# Patient Record
Sex: FEMALE | Race: OTHER | HISPANIC OR LATINO | URBAN - METROPOLITAN AREA
[De-identification: names, ages, dates, MRNs, and addresses within clinical notes are randomized per-mention and may not be internally consistent; named-entity substitution may affect disease eponyms.]

---

## 2017-05-29 ENCOUNTER — INPATIENT (INPATIENT)
Facility: HOSPITAL | Age: 47
LOS: 3 days | Discharge: HOME | DRG: 299 | End: 2017-06-02
Attending: FAMILY MEDICINE | Admitting: FAMILY MEDICINE
Payer: COMMERCIAL

## 2017-05-29 VITALS
WEIGHT: 167.55 LBS | RESPIRATION RATE: 20 BRPM | OXYGEN SATURATION: 100 % | HEART RATE: 68 BPM | TEMPERATURE: 98 F | DIASTOLIC BLOOD PRESSURE: 63 MMHG | SYSTOLIC BLOOD PRESSURE: 99 MMHG

## 2017-05-29 DIAGNOSIS — I82.409 ACUTE EMBOLISM AND THROMBOSIS OF UNSPECIFIED DEEP VEINS OF UNSPECIFIED LOWER EXTREMITY: ICD-10-CM

## 2017-05-29 LAB
ALBUMIN SERPL ELPH-MCNC: 4.1 G/DL — SIGNIFICANT CHANGE UP (ref 3.3–5.2)
ALP SERPL-CCNC: 65 U/L — SIGNIFICANT CHANGE UP (ref 40–120)
ALT FLD-CCNC: 5 U/L — SIGNIFICANT CHANGE UP
ANION GAP SERPL CALC-SCNC: 12 MMOL/L — SIGNIFICANT CHANGE UP (ref 5–17)
APTT BLD: 26.6 SEC — LOW (ref 27.5–37.4)
AST SERPL-CCNC: 15 U/L — SIGNIFICANT CHANGE UP
BASOPHILS # BLD AUTO: 0 K/UL — SIGNIFICANT CHANGE UP (ref 0–0.2)
BASOPHILS NFR BLD AUTO: 0.2 % — SIGNIFICANT CHANGE UP (ref 0–2)
BILIRUB SERPL-MCNC: 0.2 MG/DL — LOW (ref 0.4–2)
BUN SERPL-MCNC: 20 MG/DL — SIGNIFICANT CHANGE UP (ref 8–20)
CALCIUM SERPL-MCNC: 9.4 MG/DL — SIGNIFICANT CHANGE UP (ref 8.6–10.2)
CHLORIDE SERPL-SCNC: 102 MMOL/L — SIGNIFICANT CHANGE UP (ref 98–107)
CO2 SERPL-SCNC: 25 MMOL/L — SIGNIFICANT CHANGE UP (ref 22–29)
CREAT SERPL-MCNC: 0.55 MG/DL — SIGNIFICANT CHANGE UP (ref 0.5–1.3)
EOSINOPHIL # BLD AUTO: 0.5 K/UL — SIGNIFICANT CHANGE UP (ref 0–0.5)
EOSINOPHIL NFR BLD AUTO: 6.1 % — HIGH (ref 0–6)
GLUCOSE SERPL-MCNC: 92 MG/DL — SIGNIFICANT CHANGE UP (ref 70–115)
HCT VFR BLD CALC: 35.4 % — LOW (ref 37–47)
HGB BLD-MCNC: 11.7 G/DL — LOW (ref 12–16)
INR BLD: 1.09 RATIO — SIGNIFICANT CHANGE UP (ref 0.88–1.16)
LYMPHOCYTES # BLD AUTO: 2.2 K/UL — SIGNIFICANT CHANGE UP (ref 1–4.8)
LYMPHOCYTES # BLD AUTO: 25.3 % — SIGNIFICANT CHANGE UP (ref 20–55)
MCHC RBC-ENTMCNC: 28.2 PG — SIGNIFICANT CHANGE UP (ref 27–31)
MCHC RBC-ENTMCNC: 33.1 G/DL — SIGNIFICANT CHANGE UP (ref 32–36)
MCV RBC AUTO: 85.3 FL — SIGNIFICANT CHANGE UP (ref 81–99)
MONOCYTES # BLD AUTO: 0.4 K/UL — SIGNIFICANT CHANGE UP (ref 0–0.8)
MONOCYTES NFR BLD AUTO: 4 % — SIGNIFICANT CHANGE UP (ref 3–10)
NEUTROPHILS # BLD AUTO: 5.6 K/UL — SIGNIFICANT CHANGE UP (ref 1.8–8)
NEUTROPHILS NFR BLD AUTO: 64.2 % — SIGNIFICANT CHANGE UP (ref 37–73)
PLATELET # BLD AUTO: 346 K/UL — SIGNIFICANT CHANGE UP (ref 150–400)
POTASSIUM SERPL-MCNC: 4.1 MMOL/L — SIGNIFICANT CHANGE UP (ref 3.5–5.3)
POTASSIUM SERPL-SCNC: 4.1 MMOL/L — SIGNIFICANT CHANGE UP (ref 3.5–5.3)
PROT SERPL-MCNC: 7.8 G/DL — SIGNIFICANT CHANGE UP (ref 6.6–8.7)
PROTHROM AB SERPL-ACNC: 12 SEC — SIGNIFICANT CHANGE UP (ref 9.8–12.7)
RBC # BLD: 4.15 M/UL — LOW (ref 4.4–5.2)
RBC # FLD: 12.8 % — SIGNIFICANT CHANGE UP (ref 11–15.6)
SODIUM SERPL-SCNC: 139 MMOL/L — SIGNIFICANT CHANGE UP (ref 135–145)
WBC # BLD: 8.7 K/UL — SIGNIFICANT CHANGE UP (ref 4.8–10.8)
WBC # FLD AUTO: 8.7 K/UL — SIGNIFICANT CHANGE UP (ref 4.8–10.8)

## 2017-05-29 PROCEDURE — 93971 EXTREMITY STUDY: CPT | Mod: 26,RT

## 2017-05-29 PROCEDURE — 99285 EMERGENCY DEPT VISIT HI MDM: CPT

## 2017-05-29 RX ORDER — ACETAMINOPHEN 500 MG
650 TABLET ORAL EVERY 6 HOURS
Qty: 0 | Refills: 0 | Status: DISCONTINUED | OUTPATIENT
Start: 2017-05-29 | End: 2017-06-02

## 2017-05-29 RX ORDER — OXYCODONE HYDROCHLORIDE 5 MG/1
5 TABLET ORAL EVERY 6 HOURS
Qty: 0 | Refills: 0 | Status: DISCONTINUED | OUTPATIENT
Start: 2017-05-29 | End: 2017-05-30

## 2017-05-29 RX ORDER — ENOXAPARIN SODIUM 100 MG/ML
80 INJECTION SUBCUTANEOUS ONCE
Qty: 0 | Refills: 0 | Status: DISCONTINUED | OUTPATIENT
Start: 2017-05-29 | End: 2017-05-29

## 2017-05-29 RX ORDER — ENOXAPARIN SODIUM 100 MG/ML
80 INJECTION SUBCUTANEOUS ONCE
Qty: 0 | Refills: 0 | Status: COMPLETED | OUTPATIENT
Start: 2017-05-29 | End: 2017-05-29

## 2017-05-29 RX ORDER — RIVAROXABAN 15 MG-20MG
15 KIT ORAL ONCE
Qty: 0 | Refills: 0 | Status: DISCONTINUED | OUTPATIENT
Start: 2017-05-29 | End: 2017-05-29

## 2017-05-29 RX ORDER — ENOXAPARIN SODIUM 100 MG/ML
80 INJECTION SUBCUTANEOUS EVERY 12 HOURS
Qty: 0 | Refills: 0 | Status: DISCONTINUED | OUTPATIENT
Start: 2017-05-29 | End: 2017-05-30

## 2017-05-29 RX ORDER — LEVOTHYROXINE SODIUM 125 MCG
1 TABLET ORAL
Qty: 0 | Refills: 0 | COMMUNITY

## 2017-05-29 RX ORDER — LEVOTHYROXINE SODIUM 125 MCG
50 TABLET ORAL DAILY
Qty: 0 | Refills: 0 | Status: DISCONTINUED | OUTPATIENT
Start: 2017-05-29 | End: 2017-06-02

## 2017-05-29 RX ADMIN — ENOXAPARIN SODIUM 80 MILLIGRAM(S): 100 INJECTION SUBCUTANEOUS at 20:21

## 2017-05-29 NOTE — H&P ADULT - NSHPPHYSICALEXAM_GEN_ALL_CORE
HEENT- PEARLA  Neck Supple  Cardio - S1 S2 No murmer  Lungs- CTA  Abd- Soft NT ND, BS  Rectal Pelvic Breast- Refused  EXt - + RLE Calf tenderness, Knee incision clean  Skin- No SSL  Neuro - [place]

## 2017-05-29 NOTE — ED STATDOCS - OBJECTIVE STATEMENT
45 y/o F pt with PMHx of hypothyroidism and PSHx of c-sections presents to ED c/o increased pain and swelling to right calf x2 days. Pt reports she had surgery to the right meniscus 2 weeks ago and was sent to ED today to eval for DVTs. She takes Synthroid daily and Naproxen and oxycodone (last dose 11 am) prn. She has a FHx of "clotting too much" on her father's side. She notes she had a severe headache this am that subsided. Pt denies heavy lifting, fever, chills, CP, SOB, cough, phlegm, nausea, vomiting, decreased PO intake, abdominal pain, and back pain. No further complaints at this time. Allergy to penicillin, doxycycline, and amoxicillin.

## 2017-05-29 NOTE — ED ADULT NURSE NOTE - CHIEF COMPLAINT QUOTE
rt knee surgery 3 weeks ago in [facility]. swelling to the calf on the same side. tightness. no chest pain, no dyspnea. swelling

## 2017-05-29 NOTE — ED STATDOCS - PMH
Cervical dysplasia  ' 2009  HPV in female  '   Hypothyroidism  dx: ' .  medically managed  Incompetence of cervix    Iron deficiency anemia  Medically managed  Missed   '  and 2010 ( 1st trimester)  Normal IUP (intrauterine pregnancy) on prenatal ultrasound  12 weeks. E.D.C. 13

## 2017-05-29 NOTE — ED STATDOCS - MUSCULOSKELETAL, MLM
Neck is supple with FROM. Spine is midline. No CVAT. Trace edema to RLE. Joint is intact. No laxity. No palpable cord.

## 2017-05-29 NOTE — ED STATDOCS - ATTENDING CONTRIBUTION TO CARE
I, Chip Cm, performed the initial face to face bedside interview with this patient regarding history of present illness, review of symptoms and relevant past medical, social and family history.  I completed an independent physical examination.  I was the initial provider who evaluated this patient. I have signed out the follow up of any pending tests (i.e. labs, radiological studies) to the ACP.  I have communicated the patient’s plan of care and disposition with the ACP.  The history, relevant review of systems, past medical and surgical history, medical decision making, and physical examination was documented by the scribe in my presence and I attest to the accuracy of the documentation.

## 2017-05-29 NOTE — ED ADULT NURSE NOTE - PSH
History of   '   Hx of dilation and curettage  For Missed  ( 1st Trimester):  2009 and 2010  S/P cone biopsy of cervix  ' . ( twice)

## 2017-05-29 NOTE — DISCHARGE NOTE ADULT - MEDICATION SUMMARY - MEDICATIONS TO TAKE
I will START or STAY ON the medications listed below when I get home from the hospital:    acetaminophen 325 mg oral tablet  -- 2 tab(s) by mouth every 6 hours, As needed, pain  -- Indication: For Pain    Percocet 5/325 oral tablet  -- 1 tab(s) by mouth every 6 hours prn MDD:4  -- Indication: For Pian    apixaban 5 mg oral tablet  -- 1 tab(s) by mouth 2 times a day  -- Indication: For PE    levothyroxine 50 mcg (0.05 mg) oral tablet  -- 1 tab(s) by mouth once a day  -- Indication: For Thyriod

## 2017-05-29 NOTE — DISCHARGE NOTE ADULT - CARE PLAN
Principal Discharge DX:	Pulmonary embolism  Goal:	Anticoaguation  Instructions for follow-up, activity and diet:	Regular

## 2017-05-29 NOTE — ED ADULT TRIAGE NOTE - CHIEF COMPLAINT QUOTE
rt knee surgery 3 weeks ago in [facility]. swelling to the calf on the same side. tightness. no chest pain, no dyspnea. swelling

## 2017-05-29 NOTE — ED STATDOCS - PROGRESS NOTE DETAILS
Pt seen and evaluated. 47 yo F presented to ED with RLE swelling x 2-3 days. Pt is s/p knee surgery and [place] [facility] 2 weeks ago. Pt denies any CP, palp or SOB. Pt well appearing with +STS RLW and calf ttp. +DVT - Father currently in hospital also with DVT and Factor 5 def. Case d/w Dr Woodson who recommends admission.

## 2017-05-29 NOTE — ED STATDOCS - MEDICAL DECISION MAKING DETAILS
Pain and swelling to right leg s/p surgery x2 weeks ago. Sent for Dopplers to eval for DVT. Denies CP/SOB. Check results and reassess.

## 2017-05-29 NOTE — DISCHARGE NOTE ADULT - HOSPITAL COURSE
45 yo admitted with DVT and PE rt heart no strain Hypercoag workup pending, pt started on Eloquist will Follow with hematologist. + Family history of hypercoagulable state.

## 2017-05-29 NOTE — ED STATDOCS - NS ED MD SCRIBE ATTENDING SCRIBE SECTIONS
REVIEW OF SYSTEMS/VITAL SIGNS( Pullset)/HISTORY OF PRESENT ILLNESS/PHYSICAL EXAM/DISPOSITION/HIV/PAST MEDICAL/SURGICAL/SOCIAL HISTORY

## 2017-05-30 DIAGNOSIS — I26.99 OTHER PULMONARY EMBOLISM WITHOUT ACUTE COR PULMONALE: ICD-10-CM

## 2017-05-30 DIAGNOSIS — D68.59 OTHER PRIMARY THROMBOPHILIA: ICD-10-CM

## 2017-05-30 DIAGNOSIS — I82.411 ACUTE EMBOLISM AND THROMBOSIS OF RIGHT FEMORAL VEIN: ICD-10-CM

## 2017-05-30 DIAGNOSIS — I82.409 ACUTE EMBOLISM AND THROMBOSIS OF UNSPECIFIED DEEP VEINS OF UNSPECIFIED LOWER EXTREMITY: ICD-10-CM

## 2017-05-30 LAB
APTT BLD: 169.8 SEC — CRITICAL HIGH (ref 27.5–37.4)
APTT BLD: >200 SEC — CRITICAL HIGH (ref 27.5–37.4)
HCYS SERPL-MCNC: 4.8 UMOL/L — LOW (ref 5–15)
INR BLD: 1.16 RATIO — SIGNIFICANT CHANGE UP (ref 0.88–1.16)
NT-PROBNP SERPL-SCNC: 110 PG/ML — SIGNIFICANT CHANGE UP (ref 0–300)
PROTHROM AB SERPL-ACNC: 12.8 SEC — HIGH (ref 9.8–12.7)
TROPONIN T SERPL-MCNC: <0.01 NG/ML — SIGNIFICANT CHANGE UP (ref 0–0.06)

## 2017-05-30 PROCEDURE — 99223 1ST HOSP IP/OBS HIGH 75: CPT

## 2017-05-30 PROCEDURE — 99253 IP/OBS CNSLTJ NEW/EST LOW 45: CPT

## 2017-05-30 PROCEDURE — 71275 CT ANGIOGRAPHY CHEST: CPT | Mod: 26

## 2017-05-30 PROCEDURE — 93306 TTE W/DOPPLER COMPLETE: CPT | Mod: 26

## 2017-05-30 RX ORDER — HEPARIN SODIUM 5000 [USP'U]/ML
3000 INJECTION INTRAVENOUS; SUBCUTANEOUS EVERY 6 HOURS
Qty: 0 | Refills: 0 | Status: DISCONTINUED | OUTPATIENT
Start: 2017-05-30 | End: 2017-06-01

## 2017-05-30 RX ORDER — KETOROLAC TROMETHAMINE 30 MG/ML
30 SYRINGE (ML) INJECTION EVERY 6 HOURS
Qty: 0 | Refills: 0 | Status: DISCONTINUED | OUTPATIENT
Start: 2017-05-30 | End: 2017-06-02

## 2017-05-30 RX ORDER — HEPARIN SODIUM 5000 [USP'U]/ML
INJECTION INTRAVENOUS; SUBCUTANEOUS
Qty: 25000 | Refills: 0 | Status: DISCONTINUED | OUTPATIENT
Start: 2017-05-30 | End: 2017-06-01

## 2017-05-30 RX ORDER — HEPARIN SODIUM 5000 [USP'U]/ML
6500 INJECTION INTRAVENOUS; SUBCUTANEOUS ONCE
Qty: 0 | Refills: 0 | Status: COMPLETED | OUTPATIENT
Start: 2017-05-30 | End: 2017-05-30

## 2017-05-30 RX ORDER — MORPHINE SULFATE 50 MG/1
2 CAPSULE, EXTENDED RELEASE ORAL EVERY 4 HOURS
Qty: 0 | Refills: 0 | Status: DISCONTINUED | OUTPATIENT
Start: 2017-05-30 | End: 2017-06-02

## 2017-05-30 RX ORDER — MORPHINE SULFATE 50 MG/1
2 CAPSULE, EXTENDED RELEASE ORAL EVERY 6 HOURS
Qty: 0 | Refills: 0 | Status: DISCONTINUED | OUTPATIENT
Start: 2017-05-30 | End: 2017-05-30

## 2017-05-30 RX ORDER — MORPHINE SULFATE 50 MG/1
2 CAPSULE, EXTENDED RELEASE ORAL EVERY 6 HOURS
Qty: 0 | Refills: 0 | Status: DISCONTINUED | OUTPATIENT
Start: 2017-05-30 | End: 2017-06-02

## 2017-05-30 RX ORDER — HEPARIN SODIUM 5000 [USP'U]/ML
6500 INJECTION INTRAVENOUS; SUBCUTANEOUS EVERY 6 HOURS
Qty: 0 | Refills: 0 | Status: DISCONTINUED | OUTPATIENT
Start: 2017-05-30 | End: 2017-06-01

## 2017-05-30 RX ORDER — SODIUM CHLORIDE 9 MG/ML
1000 INJECTION INTRAMUSCULAR; INTRAVENOUS; SUBCUTANEOUS
Qty: 0 | Refills: 0 | Status: DISCONTINUED | OUTPATIENT
Start: 2017-05-30 | End: 2017-05-31

## 2017-05-30 RX ADMIN — MORPHINE SULFATE 2 MILLIGRAM(S): 50 CAPSULE, EXTENDED RELEASE ORAL at 20:34

## 2017-05-30 RX ADMIN — OXYCODONE HYDROCHLORIDE 5 MILLIGRAM(S): 5 TABLET ORAL at 08:38

## 2017-05-30 RX ADMIN — Medication 30 MILLIGRAM(S): at 20:22

## 2017-05-30 RX ADMIN — MORPHINE SULFATE 2 MILLIGRAM(S): 50 CAPSULE, EXTENDED RELEASE ORAL at 22:30

## 2017-05-30 RX ADMIN — MORPHINE SULFATE 2 MILLIGRAM(S): 50 CAPSULE, EXTENDED RELEASE ORAL at 21:00

## 2017-05-30 RX ADMIN — MORPHINE SULFATE 2 MILLIGRAM(S): 50 CAPSULE, EXTENDED RELEASE ORAL at 14:45

## 2017-05-30 RX ADMIN — MORPHINE SULFATE 2 MILLIGRAM(S): 50 CAPSULE, EXTENDED RELEASE ORAL at 15:05

## 2017-05-30 RX ADMIN — MORPHINE SULFATE 2 MILLIGRAM(S): 50 CAPSULE, EXTENDED RELEASE ORAL at 22:28

## 2017-05-30 RX ADMIN — ENOXAPARIN SODIUM 80 MILLIGRAM(S): 100 INJECTION SUBCUTANEOUS at 08:41

## 2017-05-30 RX ADMIN — MORPHINE SULFATE 2 MILLIGRAM(S): 50 CAPSULE, EXTENDED RELEASE ORAL at 18:34

## 2017-05-30 RX ADMIN — SODIUM CHLORIDE 100 MILLILITER(S): 9 INJECTION INTRAMUSCULAR; INTRAVENOUS; SUBCUTANEOUS at 10:05

## 2017-05-30 RX ADMIN — Medication 30 MILLIGRAM(S): at 19:31

## 2017-05-30 RX ADMIN — Medication 50 MICROGRAM(S): at 06:09

## 2017-05-30 RX ADMIN — Medication 30 MILLIGRAM(S): at 19:00

## 2017-05-30 RX ADMIN — MORPHINE SULFATE 2 MILLIGRAM(S): 50 CAPSULE, EXTENDED RELEASE ORAL at 17:43

## 2017-05-30 RX ADMIN — OXYCODONE HYDROCHLORIDE 5 MILLIGRAM(S): 5 TABLET ORAL at 09:15

## 2017-05-30 RX ADMIN — HEPARIN SODIUM 1400 UNIT(S)/HR: 5000 INJECTION INTRAVENOUS; SUBCUTANEOUS at 17:25

## 2017-05-30 RX ADMIN — HEPARIN SODIUM 6500 UNIT(S): 5000 INJECTION INTRAVENOUS; SUBCUTANEOUS at 17:24

## 2017-05-30 NOTE — CONSULT NOTE ADULT - PROBLEM SELECTOR RECOMMENDATION 2
No indication for Filter.
Patient currently not a surgical candidate at this time.  Continue anticoagulation as per primary team.  TTE pending, recommending cardiology consult pending TTE results for possible EKOS.  Recommending moving patient to monitored unit.

## 2017-05-30 NOTE — CONSULT NOTE ADULT - PROBLEM SELECTOR RECOMMENDATION 9
Patient with large PE burden. No RV strain by TTE. Not requiring O2, blood pressure and heart rate normal. Will defer any invasive PE treatment at this time.  Heparin. Eliquis, bridge for 2 hours.
Recommending SCD boots while in bed.  Continue anticoagulation as per primary team.

## 2017-05-30 NOTE — CONSULT NOTE ADULT - ATTENDING COMMENTS
Agree with above.  on echo--no strain, completely stable--no need for surgical embolectomy.  Continue with conservative care, hematology consult, AC

## 2017-05-30 NOTE — PROGRESS NOTE ADULT - SUBJECTIVE AND OBJECTIVE BOX
HPI:  45 yo with recent Knee surgery developed rt leg and calf pain (29 May 2017 18:38)     Allergies    amoxicillin (Stomach Upset; Headache)  doxycycline (Stomach Upset; Headache)    Intolerances      HPV in female  Normal IUP (intrauterine pregnancy) on prenatal ultrasound  Missed   Iron deficiency anemia  Cervical dysplasia  Hypothyroidism  Incompetence of cervix    MEDICATIONS  (STANDING):  enoxaparin Injectable 80milliGRAM(s) SubCutaneous every 12 hours  levothyroxine 50MICROGram(s) Oral daily  sodium chloride 0.9%. 1000milliLiter(s) IV Continuous <Continuous>    MEDICATIONS  (PRN):  acetaminophen   Tablet 650milliGRAM(s) Oral every 6 hours PRN pain  oxyCODONE IR 5milliGRAM(s) Oral every 6 hours PRN Moderate Pain (4 - 6)  morphine  - Injectable 2milliGRAM(s) IV Push every 6 hours PRN pain                           11.7   8.7   )-----------( 346      ( 29 May 2017 16:20 )             35.4         139  |  102  |  20.0  ----------------------------<  92  4.1   |  25.0  |  0.55    Ca    9.4      29 May 2017 16:20    TPro  7.8  /  Alb  4.1  /  TBili  0.2<L>  /  DBili  x   /  AST  15  /  ALT  5   /  AlkPhos  65      PT/INR - ( 29 May 2017 16:20 )   PT: 12.0 sec;   INR: 1.09 ratio         PTT - ( 29 May 2017 16:20 )  PTT:26.6 sec  ;  Vital Signs Last 24 Hrs  T(C): 36.7, Max: 36.8 ( @ 21:45)  T(F): 98.1, Max: 98.3 ( @ 21:45)  HR: 81 (72 - 82)  BP: 116/77 (92/68 - 116/77)  BP(mean): --  RR: 18 (16 - 18)  SpO2: 100% (96% - 100%)        I & Os for current day (as of  @ 15:17)  =============================================  IN: 300 ml / OUT: 0 ml / NET: 300 ml    Pt denies SOB, Mild Chest pain     HEENT- PEARLA Neck Supple Cardio - S1 S2 No murmur Lungs- CTA Abd- Soft NT ND, BS Rectal Pelvic Breast- Refused EXt - + RLE Calf tenderness, Knee incision clean Skin- No SSL Neuro - Aw[place]	        DVT / PE - Hercoag Workup and Lovenox, more symptomatic Cardiothorasic aware denied ICU admission Echo- pending, will transfer to a monitored floor HPI:  45 yo with recent Knee surgery developed rt leg and calf pain (29 May 2017 18:38)     Allergies    amoxicillin (Stomach Upset; Headache)  doxycycline (Stomach Upset; Headache)    Intolerances      HPV in female  Normal IUP (intrauterine pregnancy) on prenatal ultrasound  Missed   Iron deficiency anemia  Cervical dysplasia  Hypothyroidism  Incompetence of cervix    MEDICATIONS  (STANDING):  enoxaparin Injectable 80milliGRAM(s) SubCutaneous every 12 hours  levothyroxine 50MICROGram(s) Oral daily  sodium chloride 0.9%. 1000milliLiter(s) IV Continuous <Continuous>    MEDICATIONS  (PRN):  acetaminophen   Tablet 650milliGRAM(s) Oral every 6 hours PRN pain  oxyCODONE IR 5milliGRAM(s) Oral every 6 hours PRN Moderate Pain (4 - 6)  morphine  - Injectable 2milliGRAM(s) IV Push every 6 hours PRN pain                           11.7   8.7   )-----------( 346      ( 29 May 2017 16:20 )             35.4         139  |  102  |  20.0  ----------------------------<  92  4.1   |  25.0  |  0.55    Ca    9.4      29 May 2017 16:20    TPro  7.8  /  Alb  4.1  /  TBili  0.2<L>  /  DBili  x   /  AST  15  /  ALT  5   /  AlkPhos  65      PT/INR - ( 29 May 2017 16:20 )   PT: 12.0 sec;   INR: 1.09 ratio         PTT - ( 29 May 2017 16:20 )  PTT:26.6 sec  ;  Vital Signs Last 24 Hrs  T(C): 36.7, Max: 36.8 ( @ 21:45)  T(F): 98.1, Max: 98.3 ( @ 21:45)  HR: 81 (72 - 82)  BP: 116/77 (92/68 - 116/77)  BP(mean): --  RR: 18 (16 - 18)  SpO2: 100% (96% - 100%)        I & Os for current day (as of  @ 15:17)  =============================================  IN: 300 ml / OUT: 0 ml / NET: 300 ml    Pt denies SOB, Mild Chest pain     HEENT- PEARLA Neck Supple Cardio - S1 S2 No murmur Lungs- CTA Abd- Soft NT ND, BS Rectal Pelvic Breast- Refused EXt - + RLE Calf tenderness, Knee incision clean Skin- No SSL Neuro - Awake Pleasant	        DVT / PE - Hercoag Workup and Lovenox, more symptomatic Cardiothorasic aware will transfer ICU Echo- pending, will transfer to a monitored floor

## 2017-05-30 NOTE — CONSULT NOTE ADULT - SUBJECTIVE AND OBJECTIVE BOX
CC: Shortness of breath.     HPI: Patient is a  46y Female with history of recent arthroscopic surgery presenting with symptoms of leg pain and shortness of breath. Patient admitted to symptoms of chest pain and shortness of breath. Noted to have DVT and PE. PE burden noted to be significant. CT with ? of RV strain.   Patient with family history of ? hypercoagulable state.   Now with pleuritic chest pain.     PAST MEDICAL & SURGICAL HISTORY:  HPV in female: &#x27; 2009  Normal IUP (intrauterine pregnancy) on prenatal ultrasound: 12 weeks. E.D.C. 13  Missed : &#x27; 2009 and &#x27;  ( 1st trimester)  Iron deficiency anemia: Medically managed  Cervical dysplasia: &#x27;   Hypothyroidism: dx: &#x27; .  medically managed  Incompetence of cervix  Hx of dilation and curettage: For Missed  ( 1st Trimester):  &#x27;  and &#x27;   S/P cone biopsy of cervix: &#x27; . ( twice)  History of : &#x27;     FAMILY HISTORY:  Family history of DVT (Father)    SOCIAL HISTORY: no EtOH, drugs or tobacco    MEDICATIONS  (STANDING):  levothyroxine 50MICROGram(s) Oral daily  sodium chloride 0.9%. 1000milliLiter(s) IV Continuous <Continuous>  heparin  Infusion. Unit(s)/Hr IV Continuous <Continuous>  morphine  - Injectable 2milliGRAM(s) IV Push every 4 hours    ROS: All others negative    PHYSICAL EXAM:  Vital Signs Last 24 Hrs  T(C): 36.8, Max: 36.8 (- @ 21:45)  T(F): 98.3, Max: 98.3 (05-29 @ 21:45)  HR: 77 (72 - 82)  BP: 104/70 (92/68 - 116/77)  BP(mean): 83 (83 - 83)  RR: 19 (16 - 19)  SpO2: 100% (96% - 100%)  I&O's Summary    I & Os for current day (as of 30 May 2017 18:27)  =============================================  IN: 628 ml / OUT: 0 ml / NET: 628 ml    Appearance: Normal	  HEENT:   Normal oral mucosa, PERRL, EOMI	  Lymphatic: No lymphadenopathy  Cardiovascular: Normal S1 S2, No JVD, No murmurs, No edema  Respiratory: Lungs clear to auscultation	  Psychiatry: A & O x 3, Mood & affect appropriate  Gastrointestinal:  Soft, Non-tender, + BS	  Skin: No rashes, No ecchymoses, No cyanosis  Neurologic: Non-focal  Extremities: Normal range of motion, No clubbing, cyanosis or edema  Vascular: Peripheral pulses palpable 2+ bilaterally    ECG:pending.   LABS:                        11.7   8.7   )-----------( 346      ( 29 May 2017 16:20 )             35.4         139  |  102  |  20.0  ----------------------------<  92  4.1   |  25.0  |  0.55    Ca    9.4      29 May 2017 16:20    TPro  7.8  /  Alb  4.1  /  TBili  0.2<L>  /  DBili  x   /  AST  15  /  ALT  5   /  AlkPhos  65      PT/INR - ( 29 May 2017 16:20 )   PT: 12.0 sec;   INR: 1.09 ratio         PTT - ( 29 May 2017 16:20 )  PTT:26.6 sec      RADIOLOGY & ADDITIONAL STUDIES:Ct with Right main pulmonary artery.

## 2017-05-30 NOTE — CONSULT NOTE ADULT - SUBJECTIVE AND OBJECTIVE BOX
Surgeon: German AJ    Consult requesting by: Reggie AJ    HISTORY OF PRESENT ILLNESS:  46y Female with past medical and surgical history noted below who states she is 3 weeks post right knee surgery. Patient states she hasn't been feeling right over the past 2 weeks c/o right leg and calf pain. States she was receiving physical therapy s/p knee sx when she noticed an increase swelling and inflammation in the right knee/calf area which she states would go away with rest and elevation. When patients upper thigh started to bother her she states something was wrong and reported to [facility] where she was admitted to [address] [place] [address]. Patient currently c/o increase chest pressure with slight radiation into left arm. Denies SOB, fatigue, abdominal pain, extremity swelling or pain. LMP is current according to patient. Patient denies currently being pregnant or nursing.     PAST MEDICAL & SURGICAL HISTORY:  HPV in female;   Normal IUP (intrauterine pregnancy) on prenatal ultrasound: 12 weeks. E.D.C. 13  Missed :  and  (1st trimester)  Iron deficiency anemia: Medically managed  Cervical dysplasia;   Hypothyroidism: dx: 2008. Medically managed  Incompetence of cervix  Hx of dilation and curettage: For Missed  ( 1st Trimester):  and    S/P cone biopsy of cervix:  . (twice)  History of :        MEDICATIONS  (STANDING):  enoxaparin Injectable 80milliGRAM(s) SubCutaneous every 12 hours  levothyroxine 50MICROGram(s) Oral daily  sodium chloride 0.9%. 1000milliLiter(s) IV Continuous <Continuous>    MEDICATIONS  (PRN):  acetaminophen   Tablet 650milliGRAM(s) Oral every 6 hours PRN pain  oxyCODONE IR 5milliGRAM(s) Oral every 6 hours PRN Moderate Pain (4 - 6)  morphine  - Injectable 2milliGRAM(s) IV Push every 6 hours PRN pain    Allergies    amoxicillin (Stomach Upset; Headache)  doxycycline (Stomach Upset; Headache)      FAMILY HISTORY:  Family history of DVT (Father)  Sister- hypercoagulable disorder.      Review of Systems  CONSTITUTIONAL:  Fevers[ ] chills[ ] sweats[ ] fatigue[ ] weight loss[ ] weight gain [ ]                                     NEGATIVE [X]   NEURO:  parathesias[ ] seizures [ ]  syncope [ ]  confusion [ ]                                                                                NEGATIVE[X]   EYES: glasses[ ]  blurry vision[ ]  discharge[ ] pain[ ] glaucoma [ ]                                                                          NEGATIVE[X]   ENMT:  difficulty hearing [ ]  vertigo[ ]  dysphagia[ ] epistaxis[ ] recent dental work [ ]                                    NEGATIVE[X]   CV:  chest pain[X] palpitations[ ] ABBASI [ ] diaphoresis [ ]                                                                                           NEGATIVE[ ]   RESPIRATORY:  wheezing[ ] SOB[ ] cough [ ] sputum[ ] hemoptysis[ ]                                                                  NEGATIVE[ ]   GI:  nausea[ ]  vommiting [ ]  diarrhea[ ] constipation [ ] melena [ ]                                                                      NEGATIVE[ ]   : hematuria[ ]  dysuria[ ] urgency[ ] incontinence[ ]                                                                                            NEGATIVE[X]   MUSKULOSKELETAL:  arthritis[ ]  joint swelling [X] muscle weakness [ ]                                                                NEGATIVE[]   HEME/LYMPH:  bruises easily[ ] enlarged lymph nodes[ ] tender lymph nodes[ ]                                               NEGATIVE[X]       PHYSICAL EXAM  Vital Signs Last 24 Hrs  T(C): 36.7, Max: 36.8 ( @ 21:45)  T(F): 98.1, Max: 98.3 ( @ 21:45)  HR: 81 (72 - 82)  BP: 116/77 (92/68 - 116/77)  RR: 18 (16 - 18)  SpO2: 100% (96% - 100%)    CONSTITUTIONAL:                                                                          WNL[ ]   Neuro: WNL[X] Normal exam oriented to person/place & time with no focal motor or sensory  deficits. Other __________                    Eyes: WNL[ ]   Normal exam of conjunctiva & lids, pupils equally reactive. Other______________________________     ENT: WNL[ ]    Normal exam of nasal/oral mucosa with absence of cyanosis. Other_____________________________  Neck: WNL[X]  Normal exam of jugular veins, trachea & thyroid. Other_________________________________________  Chest: WNL[X] Normal lung exam with good air movement absence of wheezes, rales, or rhonchi: Other_________________________________________                                                                                CV:  Auscultation: normal [X] S3[ ] S4[ ] Irregular [ ] Rub[ ] Clicks[ ]    Murmurs none:[X]systolic [ ]  diastolic [ ] holosystolic [ ]                                                                                GI: WNL[X] Normal exam of abdomen, liver & spleen with no noted masses or tenderness. Other______________________                                                                                                        Extremities: WNL[X] Normal no evidence of cyanosis or deformity Edema: none[X]trace[ ]1+[ ]2+[ ]3+[ ]4+[ ]  Lower Extremity Pulses: Right[ ] Left[ ]Varicosities[ ]  SKIN :WNL[X] Normal exam to inspection & palation. Other:____________________                                                          LABS:                        11.7   8.7   )-----------( 346      ( 29 May 2017 16:20 )             35.4         139  |  102  |  20.0  ----------------------------<  92  4.1   |  25.0  |  0.55    Ca    9.4      29 May 2017 16:20    TPro  7.8  /  Alb  4.1  /  TBili  0.2<L>  /  DBili  x   /  AST  15  /  ALT  5   /  AlkPhos  65      PT/INR - ( 29 May 2017 16:20 )   PT: 12.0 sec;   INR: 1.09 ratio         PTT - ( 29 May 2017 16:20 )  PTT:26.6 sec      TTE: pending    CTA:  IMPRESSION:   1.  Pulmonary emboli are present within the distal right main pulmonary   artery with extension into the middle and lower lobar pulmonary arteries.  2.  Slightly increased size of the right ventricular luminal diameter   with reflux of contrast into the hepatic veins, possibly evidence of   right heart strain. Correlation with echocardiogram is recommended. Surgeon: German AJ    Consult requesting by: Reggie AJ    HISTORY OF PRESENT ILLNESS:  46y Female with past medical and surgical history noted below who states she is 3 weeks post right knee surgery. Patient states she hasn't been feeling right over the past 2 weeks c/o right leg and calf pain. States she was receiving physical therapy s/p knee sx when she noticed an increase swelling and inflammation in the right knee/calf area which she states would go away with rest and elevation. When patients upper thigh started to bother her she states something was wrong and reported to [facility] where she was admitted to [address] [place] [address]. Patient currently c/o increase chest pressure with slight radiation into left arm. Denies SOB, fatigue, abdominal pain, extremity swelling or pain. LMP is current according to patient. Patient denies currently being pregnant or nursing. Patient denies being on any form of anticoagulation post right knee surgery.    PAST MEDICAL & SURGICAL HISTORY:  HPV in female;   Normal IUP (intrauterine pregnancy) on prenatal ultrasound: 12 weeks. E.D.C. 13  Missed :  and  (1st trimester)  Iron deficiency anemia: Medically managed  Cervical dysplasia;   Hypothyroidism: dx: 2008. Medically managed  Incompetence of cervix  Hx of dilation and curettage: For Missed  ( 1st Trimester):  and    S/P cone biopsy of cervix:  . (twice)  History of :        MEDICATIONS  (STANDING):  enoxaparin Injectable 80milliGRAM(s) SubCutaneous every 12 hours  levothyroxine 50MICROGram(s) Oral daily  sodium chloride 0.9%. 1000milliLiter(s) IV Continuous <Continuous>    MEDICATIONS  (PRN):  acetaminophen   Tablet 650milliGRAM(s) Oral every 6 hours PRN pain  oxyCODONE IR 5milliGRAM(s) Oral every 6 hours PRN Moderate Pain (4 - 6)  morphine  - Injectable 2milliGRAM(s) IV Push every 6 hours PRN pain    Allergies    amoxicillin (Stomach Upset; Headache)  doxycycline (Stomach Upset; Headache)      FAMILY HISTORY:  Family history of DVT (Father)  Sister- hypercoagulable disorder.      Review of Systems  CONSTITUTIONAL:  Fevers[ ] chills[ ] sweats[ ] fatigue[ ] weight loss[ ] weight gain [ ]                                     NEGATIVE [X]   NEURO:  parathesias[ ] seizures [ ]  syncope [ ]  confusion [ ]                                                                                NEGATIVE[X]   EYES: glasses[ ]  blurry vision[ ]  discharge[ ] pain[ ] glaucoma [ ]                                                                          NEGATIVE[X]   ENMT:  difficulty hearing [ ]  vertigo[ ]  dysphagia[ ] epistaxis[ ] recent dental work [ ]                                    NEGATIVE[X]   CV:  chest pain[X] palpitations[ ] ABBASI [ ] diaphoresis [ ]                                                                                           NEGATIVE[ ]   RESPIRATORY:  wheezing[ ] SOB[ ] cough [ ] sputum[ ] hemoptysis[ ]                                                                  NEGATIVE[ ]   GI:  nausea[ ]  vommiting [ ]  diarrhea[ ] constipation [ ] melena [ ]                                                                      NEGATIVE[ ]   : hematuria[ ]  dysuria[ ] urgency[ ] incontinence[ ]                                                                                            NEGATIVE[X]   MUSKULOSKELETAL:  arthritis[ ]  joint swelling [X] muscle weakness [ ]                                                                NEGATIVE[]   HEME/LYMPH:  bruises easily[ ] enlarged lymph nodes[ ] tender lymph nodes[ ]                                               NEGATIVE[X]       PHYSICAL EXAM  Vital Signs Last 24 Hrs  T(C): 36.7, Max: 36.8 (- @ 21:45)  T(F): 98.1, Max: 98.3 ( @ 21:45)  HR: 81 (72 - 82)  BP: 116/77 (92/68 - 116/77)  RR: 18 (16 - 18)  SpO2: 100% (96% - 100%)    CONSTITUTIONAL:                                                                          WNL[ ]   Neuro: WNL[X] Normal exam oriented to person/place & time with no focal motor or sensory  deficits. Other __________                    Eyes: WNL[ ]   Normal exam of conjunctiva & lids, pupils equally reactive. Other______________________________     ENT: WNL[ ]    Normal exam of nasal/oral mucosa with absence of cyanosis. Other_____________________________  Neck: WNL[X]  Normal exam of jugular veins, trachea & thyroid. Other_________________________________________  Chest: WNL[X] Normal lung exam with good air movement absence of wheezes, rales, or rhonchi: Other_________________________________________                                                                                CV:  Auscultation: normal [X] S3[ ] S4[ ] Irregular [ ] Rub[ ] Clicks[ ]    Murmurs none:[X]systolic [ ]  diastolic [ ] holosystolic [ ]                                                                                GI: WNL[X] Normal exam of abdomen, liver & spleen with no noted masses or tenderness. Other______________________                                                                                                        Extremities: WNL[X] Normal no evidence of cyanosis or deformity Edema: none[X]trace[ ]1+[ ]2+[ ]3+[ ]4+[ ]  Lower Extremity Pulses: Right[ ] Left[ ]Varicosities[ ]  SKIN :WNL[X] Normal exam to inspection & palation. Other:____________________                                                          LABS:                        11.7   8.7   )-----------( 346      ( 29 May 2017 16:20 )             35.4     05    139  |  102  |  20.0  ----------------------------<  92  4.1   |  25.0  |  0.55    Ca    9.4      29 May 2017 16:20    TPro  7.8  /  Alb  4.1  /  TBili  0.2<L>  /  DBili  x   /  AST  15  /  ALT  5   /  AlkPhos  65      PT/INR - ( 29 May 2017 16:20 )   PT: 12.0 sec;   INR: 1.09 ratio         PTT - ( 29 May 2017 16:20 )  PTT:26.6 sec      TTE: pending    CTA:  IMPRESSION:   1.  Pulmonary emboli are present within the distal right main pulmonary   artery with extension into the middle and lower lobar pulmonary arteries.  2.  Slightly increased size of the right ventricular luminal diameter   with reflux of contrast into the hepatic veins, possibly evidence of   right heart strain. Correlation with echocardiogram is recommended.

## 2017-05-30 NOTE — CONSULT NOTE ADULT - ASSESSMENT
47 y/o female s/p right knee sx 3 weeks ago c/o right leg pain and swelling found to have rt pulmonary artery PE. Currently hemodynamically stable.

## 2017-05-31 LAB
ANION GAP SERPL CALC-SCNC: 11 MMOL/L — SIGNIFICANT CHANGE UP (ref 5–17)
APTT BLD: 192.6 SEC — CRITICAL HIGH (ref 27.5–37.4)
APTT BLD: 34 SEC — SIGNIFICANT CHANGE UP (ref 27.5–37.4)
APTT BLD: 63.2 SEC — HIGH (ref 27.5–37.4)
APTT BLD: 65.3 SEC — HIGH (ref 27.5–37.4)
AT III ACT/NOR PPP CHRO: 94 % — SIGNIFICANT CHANGE UP (ref 85–135)
BUN SERPL-MCNC: 19 MG/DL — SIGNIFICANT CHANGE UP (ref 8–20)
CALCIUM SERPL-MCNC: 8.7 MG/DL — SIGNIFICANT CHANGE UP (ref 8.6–10.2)
CARDIOLIPIN AB SER-ACNC: NEGATIVE — SIGNIFICANT CHANGE UP
CHLORIDE SERPL-SCNC: 102 MMOL/L — SIGNIFICANT CHANGE UP (ref 98–107)
CO2 SERPL-SCNC: 24 MMOL/L — SIGNIFICANT CHANGE UP (ref 22–29)
CREAT SERPL-MCNC: 0.52 MG/DL — SIGNIFICANT CHANGE UP (ref 0.5–1.3)
DRVVT SCREEN TO CONFIRM RATIO: SIGNIFICANT CHANGE UP
GLUCOSE SERPL-MCNC: 84 MG/DL — SIGNIFICANT CHANGE UP (ref 70–115)
HCT VFR BLD CALC: 32.5 % — LOW (ref 37–47)
HCT VFR BLD CALC: 34.6 % — LOW (ref 37–47)
HGB BLD-MCNC: 10.9 G/DL — LOW (ref 12–16)
HGB BLD-MCNC: 11.5 G/DL — LOW (ref 12–16)
LA NT DPL PPP QL: 32.1 SEC — SIGNIFICANT CHANGE UP
MCHC RBC-ENTMCNC: 28.1 PG — SIGNIFICANT CHANGE UP (ref 27–31)
MCHC RBC-ENTMCNC: 28.1 PG — SIGNIFICANT CHANGE UP (ref 27–31)
MCHC RBC-ENTMCNC: 33.2 G/DL — SIGNIFICANT CHANGE UP (ref 32–36)
MCHC RBC-ENTMCNC: 33.5 G/DL — SIGNIFICANT CHANGE UP (ref 32–36)
MCV RBC AUTO: 83.8 FL — SIGNIFICANT CHANGE UP (ref 81–99)
MCV RBC AUTO: 84.6 FL — SIGNIFICANT CHANGE UP (ref 81–99)
PLATELET # BLD AUTO: 317 K/UL — SIGNIFICANT CHANGE UP (ref 150–400)
PLATELET # BLD AUTO: 317 K/UL — SIGNIFICANT CHANGE UP (ref 150–400)
POTASSIUM SERPL-MCNC: 4 MMOL/L — SIGNIFICANT CHANGE UP (ref 3.5–5.3)
POTASSIUM SERPL-SCNC: 4 MMOL/L — SIGNIFICANT CHANGE UP (ref 3.5–5.3)
PROT C ACT/NOR PPP: 121 % — SIGNIFICANT CHANGE UP (ref 74–150)
PROT S FREE AG PPP IA-ACNC: 91 % — SIGNIFICANT CHANGE UP (ref 61–131)
RBC # BLD: 3.88 M/UL — LOW (ref 4.4–5.2)
RBC # BLD: 4.09 M/UL — LOW (ref 4.4–5.2)
RBC # FLD: 12.8 % — SIGNIFICANT CHANGE UP (ref 11–15.6)
RBC # FLD: 12.8 % — SIGNIFICANT CHANGE UP (ref 11–15.6)
SODIUM SERPL-SCNC: 137 MMOL/L — SIGNIFICANT CHANGE UP (ref 135–145)
WBC # BLD: 6.7 K/UL — SIGNIFICANT CHANGE UP (ref 4.8–10.8)
WBC # BLD: 9.8 K/UL — SIGNIFICANT CHANGE UP (ref 4.8–10.8)
WBC # FLD AUTO: 6.7 K/UL — SIGNIFICANT CHANGE UP (ref 4.8–10.8)
WBC # FLD AUTO: 9.8 K/UL — SIGNIFICANT CHANGE UP (ref 4.8–10.8)

## 2017-05-31 PROCEDURE — 71010: CPT | Mod: 26

## 2017-05-31 RX ADMIN — HEPARIN SODIUM 6500 UNIT(S): 5000 INJECTION INTRAVENOUS; SUBCUTANEOUS at 00:43

## 2017-05-31 RX ADMIN — MORPHINE SULFATE 2 MILLIGRAM(S): 50 CAPSULE, EXTENDED RELEASE ORAL at 14:11

## 2017-05-31 RX ADMIN — HEPARIN SODIUM 1100 UNIT(S)/HR: 5000 INJECTION INTRAVENOUS; SUBCUTANEOUS at 22:04

## 2017-05-31 RX ADMIN — HEPARIN SODIUM 1100 UNIT(S)/HR: 5000 INJECTION INTRAVENOUS; SUBCUTANEOUS at 10:15

## 2017-05-31 RX ADMIN — MORPHINE SULFATE 2 MILLIGRAM(S): 50 CAPSULE, EXTENDED RELEASE ORAL at 10:28

## 2017-05-31 RX ADMIN — MORPHINE SULFATE 2 MILLIGRAM(S): 50 CAPSULE, EXTENDED RELEASE ORAL at 07:02

## 2017-05-31 RX ADMIN — MORPHINE SULFATE 2 MILLIGRAM(S): 50 CAPSULE, EXTENDED RELEASE ORAL at 10:04

## 2017-05-31 RX ADMIN — MORPHINE SULFATE 2 MILLIGRAM(S): 50 CAPSULE, EXTENDED RELEASE ORAL at 14:26

## 2017-05-31 RX ADMIN — HEPARIN SODIUM 0 UNIT(S)/HR: 5000 INJECTION INTRAVENOUS; SUBCUTANEOUS at 09:15

## 2017-05-31 RX ADMIN — Medication 50 MICROGRAM(S): at 06:16

## 2017-05-31 RX ADMIN — MORPHINE SULFATE 2 MILLIGRAM(S): 50 CAPSULE, EXTENDED RELEASE ORAL at 07:20

## 2017-05-31 RX ADMIN — HEPARIN SODIUM 1100 UNIT(S)/HR: 5000 INJECTION INTRAVENOUS; SUBCUTANEOUS at 15:41

## 2017-05-31 NOTE — PROGRESS NOTE ADULT - SUBJECTIVE AND OBJECTIVE BOX
HPI:  47 yo with recent Knee surgery developed rt leg and calf pain (29 May 2017 18:38)     Allergies    amoxicillin (Stomach Upset; Headache)  doxycycline (Stomach Upset; Headache)    Intolerances      HPV in female  Normal IUP (intrauterine pregnancy) on prenatal ultrasound  Missed   Iron deficiency anemia  Cervical dysplasia  Hypothyroidism  Incompetence of cervix    MEDICATIONS  (STANDING):  levothyroxine 50MICROGram(s) Oral daily  heparin  Infusion. Unit(s)/Hr IV Continuous <Continuous>  morphine  - Injectable 2milliGRAM(s) IV Push every 4 hours    MEDICATIONS  (PRN):  acetaminophen   Tablet 650milliGRAM(s) Oral every 6 hours PRN pain  morphine  - Injectable 2milliGRAM(s) IV Push every 6 hours PRN pain  heparin  Injectable 6500Unit(s) IV Push every 6 hours PRN For aPTT less than 40  heparin  Injectable 3000Unit(s) IV Push every 6 hours PRN For aPTT between 40 - 57  ketorolac   Injectable 30milliGRAM(s) IV Push every 6 hours PRN Severe Pain (7 - 10)  oxyCODONE  5 mG/acetaminophen 325 mG 1Tablet(s) Oral every 4 hours PRN Moderate Pain (4 - 6)                           11.5   9.8   )-----------( 317      ( 31 May 2017 07:51 )             34.6         137  |  102  |  19.0  ----------------------------<  84  4.0   |  24.0  |  0.52    Ca    8.7      31 May 2017 07:51      PT/INR - ( 30 May 2017 18:17 )   PT: 12.8 sec;   INR: 1.16 ratio         PTT - ( 31 May 2017 15:20 )  PTT:63.2 sec  ;  Vital Signs Last 24 Hrs  T(C): 37.2, Max: 37.3 ( @ 12:00)  T(F): 98.9, Max: 99.1 ( @ 12:00)  HR: 75 (65 - 75)  BP: 111/74 (91/60 - 113/69)  BP(mean): 85 (71 - 85)  RR: 15 (15 - 24)  SpO2: 98% (95% - 98%)  CAPILLARY BLOOD GLUCOSE      I & Os for current day (as of  @ 19:00)  =============================================  IN: 1426 ml / OUT: 0 ml / NET: 1426 ml      Pt denies SOB, Mild persistent Chest pain     HEENT- PEARLA Neck Supple Cardio - S1 S2 No murmur Lungs- CTA Abd- Soft NT ND, BS Rectal Pelvic Breast- Refused EXt - + RLE Calf tenderness, Knee incision clean Skin- No SSL Neuro - Awake Pleasant	        DVT / PE - Hercoag Workup Heparin ICU Echo- No Rt Failure, Poss change to Eloquist in am

## 2017-05-31 NOTE — PROGRESS NOTE ADULT - SUBJECTIVE AND OBJECTIVE BOX
TTE, cardiac enzymes, BNP reviewed. Noted to be normal. No further invasive management for PE. Consider Eliquis.

## 2017-06-01 LAB
APTT BLD: 77.1 SEC — HIGH (ref 27.5–37.4)
B2 GLYCOPROT1 AB SER QL: NEGATIVE — SIGNIFICANT CHANGE UP
HCT VFR BLD CALC: 32.9 % — LOW (ref 37–47)
HGB BLD-MCNC: 10.9 G/DL — LOW (ref 12–16)
MCHC RBC-ENTMCNC: 28.4 PG — SIGNIFICANT CHANGE UP (ref 27–31)
MCHC RBC-ENTMCNC: 33.1 G/DL — SIGNIFICANT CHANGE UP (ref 32–36)
MCV RBC AUTO: 85.7 FL — SIGNIFICANT CHANGE UP (ref 81–99)
PLATELET # BLD AUTO: 293 K/UL — SIGNIFICANT CHANGE UP (ref 150–400)
RBC # BLD: 3.84 M/UL — LOW (ref 4.4–5.2)
RBC # FLD: 12.4 % — SIGNIFICANT CHANGE UP (ref 11–15.6)
WBC # BLD: 5.9 K/UL — SIGNIFICANT CHANGE UP (ref 4.8–10.8)
WBC # FLD AUTO: 5.9 K/UL — SIGNIFICANT CHANGE UP (ref 4.8–10.8)

## 2017-06-01 PROCEDURE — 70496 CT ANGIOGRAPHY HEAD: CPT | Mod: 26

## 2017-06-01 RX ORDER — ACETAMINOPHEN 500 MG
2 TABLET ORAL
Qty: 0 | Refills: 0 | COMMUNITY
Start: 2017-06-01

## 2017-06-01 RX ORDER — LEVOTHYROXINE SODIUM 125 MCG
1 TABLET ORAL
Qty: 0 | Refills: 0 | COMMUNITY
Start: 2017-06-01

## 2017-06-01 RX ORDER — LEVOTHYROXINE SODIUM 125 MCG
1 TABLET ORAL
Qty: 0 | Refills: 0 | COMMUNITY

## 2017-06-01 RX ORDER — APIXABAN 2.5 MG/1
1 TABLET, FILM COATED ORAL
Qty: 60 | Refills: 0 | OUTPATIENT
Start: 2017-06-01

## 2017-06-01 RX ORDER — APIXABAN 2.5 MG/1
5 TABLET, FILM COATED ORAL
Qty: 0 | Refills: 0 | Status: DISCONTINUED | OUTPATIENT
Start: 2017-06-01 | End: 2017-06-02

## 2017-06-01 RX ADMIN — Medication 50 MICROGRAM(S): at 06:36

## 2017-06-01 RX ADMIN — APIXABAN 5 MILLIGRAM(S): 2.5 TABLET, FILM COATED ORAL at 18:31

## 2017-06-01 RX ADMIN — HEPARIN SODIUM 1100 UNIT(S)/HR: 5000 INJECTION INTRAVENOUS; SUBCUTANEOUS at 07:05

## 2017-06-01 NOTE — CONSULT NOTE ADULT - ASSESSMENT
The patient is a 46y Female Headache, possible tension.  Given DVT/PE would consider CIARA.  Will order MRI brain to r/o CVA  Analgesics prn headache  will follow with you    Vinny Flores MD PhD   [id number]

## 2017-06-01 NOTE — PROGRESS NOTE ADULT - SUBJECTIVE AND OBJECTIVE BOX
HPI:  47 yo with recent Knee surgery developed rt leg and calf pain (29 May 2017 18:38)     Allergies    amoxicillin (Stomach Upset; Headache)  doxycycline (Stomach Upset; Headache)    Intolerances      HPV in female  Normal IUP (intrauterine pregnancy) on prenatal ultrasound  Missed   Iron deficiency anemia  Cervical dysplasia  Hypothyroidism  Incompetence of cervix    MEDICATIONS  (STANDING):  levothyroxine 50MICROGram(s) Oral daily  heparin  Infusion. Unit(s)/Hr IV Continuous <Continuous>  morphine  - Injectable 2milliGRAM(s) IV Push every 4 hours  apixaban 5milliGRAM(s) Oral two times a day    MEDICATIONS  (PRN):  acetaminophen   Tablet 650milliGRAM(s) Oral every 6 hours PRN pain  morphine  - Injectable 2milliGRAM(s) IV Push every 6 hours PRN pain  heparin  Injectable 6500Unit(s) IV Push every 6 hours PRN For aPTT less than 40  heparin  Injectable 3000Unit(s) IV Push every 6 hours PRN For aPTT between 40 - 57  ketorolac   Injectable 30milliGRAM(s) IV Push every 6 hours PRN Severe Pain (7 - 10)  oxyCODONE  5 mG/acetaminophen 325 mG 1Tablet(s) Oral every 4 hours PRN Moderate Pain (4 - 6)                           10.9   5.9   )-----------( 293      ( 2017 05:44 )             32.9     -    137  |  102  |  19.0  ----------------------------<  84  4.0   |  24.0  |  0.52    Ca    8.7      31 May 2017 07:51      PT/INR - ( 30 May 2017 18:17 )   PT: 12.8 sec;   INR: 1.16 ratio         PTT - ( 2017 05:44 )  PTT:77.1 sec  ;  Vital Signs Last 24 Hrs  T(C): 36.7, Max: 36.8 ( @ 06:29)  T(F): 98, Max: 98.3 ( @ 06:29)  HR: 64 (64 - 75)  BP: 98/68 (97/61 - 111/74)  BP(mean): --  RR: 16 (15 - 16)  SpO2: 100% (98% - 100%)  CAPILLARY BLOOD GLUCOSE    I & Os for 24h ending  @ 07:00  =============================================  IN: 143 ml / OUT: 0 ml / NET: 143 ml    I & Os for current day (as of  @ 17:11)  =============================================  IN: 240 ml / OUT: 350 ml / NET: -110 ml        Pt denies SOB, Min persistent Chest pain. Pt had headache with left facial numbness today now resolved    HEENT- PEARLA Neck Supple Cardio - S1 S2 No murmur Lungs- CTA Abd- Soft NT ND, BS Rectal Pelvic Breast- Refused EXt - + RLE Calf tenderness, Knee incision clean Skin- No SSL Neuro - Awake Pleasant Sensation Nl all, Power 5/5	        DVT / PE - Hercoag Workup Heparin ICU Echo- No Rt Failure, Start now  Eloquist d/c heparin in 2 hours  Migraine - CT Normal Neuro appreciated

## 2017-06-01 NOTE — CONSULT NOTE ADULT - SUBJECTIVE AND OBJECTIVE BOX
[facility]                                 Mark Segura, & Moe                                  [address]                                        [place], [place], [address]                                             [phone]    HISTORY:    The patient is a 46y Female admitted with DVT/PE who is now complaining of headache and left facial numbness.  The headache is coming from left posterior neck and wraps up around top of left side of head inot face.  This started in the past day or two.  She has no other neurological complaints.  Neuro eval s called    PAST MEDICAL & SURGICAL HISTORY:  HPV in female: &#x27; 2009  Normal IUP (intrauterine pregnancy) on prenatal ultrasound: 12 weeks. E.D.C. 13  Missed : &#x27; 2009 and &#x27;  ( 1st trimester)  Iron deficiency anemia: Medically managed  Cervical dysplasia: &#x27;   Hypothyroidism: dx: &#x27; .  medically managed  Incompetence of cervix  Hx of dilation and curettage: For Missed  ( 1st Trimester):  &#x27;  and &#x27;   S/P cone biopsy of cervix: &#x27; 2009. ( twice)  History of : &#x27;       MEDICATIONS  (STANDING):  levothyroxine 50MICROGram(s) Oral daily  heparin  Infusion. Unit(s)/Hr IV Continuous <Continuous>  morphine  - Injectable 2milliGRAM(s) IV Push every 4 hours    MEDICATIONS  (PRN):  acetaminophen   Tablet 650milliGRAM(s) Oral every 6 hours PRN pain  morphine  - Injectable 2milliGRAM(s) IV Push every 6 hours PRN pain  heparin  Injectable 6500Unit(s) IV Push every 6 hours PRN For aPTT less than 40  heparin  Injectable 3000Unit(s) IV Push every 6 hours PRN For aPTT between 40 - 57  ketorolac   Injectable 30milliGRAM(s) IV Push every 6 hours PRN Severe Pain (7 - 10)  oxyCODONE  5 mG/acetaminophen 325 mG 1Tablet(s) Oral every 4 hours PRN Moderate Pain (4 - 6)      Allergies    amoxicillin (Stomach Upset; Headache)  doxycycline (Stomach Upset; Headache)    Intolerances        SOCIAL HISTORY:  no tob/etoh abuse/drug abuse  FAMILY HISTORY:  Family history of DVT (Father)      ROS:  The patient denies fevers or weight changes.  Denies headache or dizziness.  Denies chest pain.  Denies shortness of breath.  Denies abdominal pain, nausea, or vomiting.  Denies change in urinary pattern.  Denies rash.  Denies recent mood changes.    Exam:  Vital Signs Last 24 Hrs  T(C): 36.8, Max: 37.2 ( @ 15:35)  T(F): 98.3, Max: 98.9 ( @ 15:35)  HR: 64 (64 - 75)  BP: 98/68 (97/61 - 111/74)  BP(mean): --  RR: 16 (15 - 16)  SpO2: 100% (98% - 100%)  General: NAD    Mental status: The patient is awake, alert, and fully oriented. There is no aphasia.    Cranial nerves: . Pupils react Symmetrically to light. There is no visual field deficit to confrontation. Extraocular motion is full with no nystagmus. There is no ptosis. Facial sensation is intact. Facial musculature is symmetric. Palate elevates symmetrically. Tongue is midline.    Motor: There is normal bulk and tone.  Strength is 5/5 in the right arm and leg.   Strength is 5/5 in the left arm and leg.    Sensation: Intact to light touch and pin.    Reflexes: 2+ throughout and plantar responses are flexor.    Cerebellar: There is no dysmetria on finger to nose testing.    LABS:                         10.9   5.9   )-----------( 293      ( 2017 05:44 )             32.9           137  |  102  |  19.0  ----------------------------<  84  4.0   |  24.0  |  0.52    Ca    8.7      31 May 2017 07:51        PT/INR - ( 30 May 2017 18:17 )   PT: 12.8 sec;   INR: 1.16 ratio         PTT - ( 2017 05:44 )  PTT:77.1 sec    RADIOLOGY & ADDITIONAL STUDIES:  await head CT

## 2017-06-02 VITALS
OXYGEN SATURATION: 99 % | HEART RATE: 76 BPM | RESPIRATION RATE: 16 BRPM | SYSTOLIC BLOOD PRESSURE: 94 MMHG | DIASTOLIC BLOOD PRESSURE: 62 MMHG

## 2017-06-02 LAB
APTT BLD: 33 SEC — SIGNIFICANT CHANGE UP (ref 27.5–37.4)
HCT VFR BLD CALC: 33.6 % — LOW (ref 37–47)
HGB BLD-MCNC: 11.2 G/DL — LOW (ref 12–16)
MCHC RBC-ENTMCNC: 28.4 PG — SIGNIFICANT CHANGE UP (ref 27–31)
MCHC RBC-ENTMCNC: 33.3 G/DL — SIGNIFICANT CHANGE UP (ref 32–36)
MCV RBC AUTO: 85.3 FL — SIGNIFICANT CHANGE UP (ref 81–99)
PLATELET # BLD AUTO: 302 K/UL — SIGNIFICANT CHANGE UP (ref 150–400)
RBC # BLD: 3.94 M/UL — LOW (ref 4.4–5.2)
RBC # FLD: 12.8 % — SIGNIFICANT CHANGE UP (ref 11–15.6)
WBC # BLD: 5.4 K/UL — SIGNIFICANT CHANGE UP (ref 4.8–10.8)
WBC # FLD AUTO: 5.4 K/UL — SIGNIFICANT CHANGE UP (ref 4.8–10.8)

## 2017-06-02 PROCEDURE — 86147 CARDIOLIPIN ANTIBODY EA IG: CPT

## 2017-06-02 PROCEDURE — 85613 RUSSELL VIPER VENOM DILUTED: CPT

## 2017-06-02 PROCEDURE — 85730 THROMBOPLASTIN TIME PARTIAL: CPT

## 2017-06-02 PROCEDURE — 71275 CT ANGIOGRAPHY CHEST: CPT

## 2017-06-02 PROCEDURE — 99285 EMERGENCY DEPT VISIT HI MDM: CPT | Mod: 25

## 2017-06-02 PROCEDURE — 36415 COLL VENOUS BLD VENIPUNCTURE: CPT

## 2017-06-02 PROCEDURE — 86146 BETA-2 GLYCOPROTEIN ANTIBODY: CPT

## 2017-06-02 PROCEDURE — 80048 BASIC METABOLIC PNL TOTAL CA: CPT

## 2017-06-02 PROCEDURE — 80053 COMPREHEN METABOLIC PANEL: CPT

## 2017-06-02 PROCEDURE — 83090 ASSAY OF HOMOCYSTEINE: CPT

## 2017-06-02 PROCEDURE — 84484 ASSAY OF TROPONIN QUANT: CPT

## 2017-06-02 PROCEDURE — 70496 CT ANGIOGRAPHY HEAD: CPT

## 2017-06-02 PROCEDURE — 93306 TTE W/DOPPLER COMPLETE: CPT

## 2017-06-02 PROCEDURE — 83880 ASSAY OF NATRIURETIC PEPTIDE: CPT

## 2017-06-02 PROCEDURE — 85610 PROTHROMBIN TIME: CPT

## 2017-06-02 PROCEDURE — 85027 COMPLETE CBC AUTOMATED: CPT

## 2017-06-02 PROCEDURE — 85303 CLOT INHIBIT PROT C ACTIVITY: CPT

## 2017-06-02 PROCEDURE — 93971 EXTREMITY STUDY: CPT

## 2017-06-02 PROCEDURE — 85300 ANTITHROMBIN III ACTIVITY: CPT

## 2017-06-02 PROCEDURE — 71045 X-RAY EXAM CHEST 1 VIEW: CPT

## 2017-06-02 PROCEDURE — 85306 CLOT INHIBIT PROT S FREE: CPT

## 2017-06-02 RX ADMIN — APIXABAN 5 MILLIGRAM(S): 2.5 TABLET, FILM COATED ORAL at 05:39

## 2017-06-02 RX ADMIN — Medication 50 MICROGRAM(S): at 05:39

## 2017-06-02 NOTE — PROGRESS NOTE ADULT - ASSESSMENT
The patient is a 46y Female who had some transient headache and face numbness.   Now resolved.     Awaiting MRI brain.

## 2017-06-02 NOTE — PROGRESS NOTE ADULT - SUBJECTIVE AND OBJECTIVE BOX
[facility]                                 Mark Segura, & Moe                                  [address]                                        [place], [place], [address]                                             [phone]        No new complaints.  No further headache or facial numbness.     Vital Signs Last 24 Hrs  T(F): 98.1, Max: 98.2 (06-01 @ 21:58)  HR: 68 (64 - 86)  BP: 88/54 (88/54 - 107/66)  RR: 16 (16 - 16)  SpO2: 100% (97% - 100%)    Awake and alert.  Pupils react.  Face symmetric.  Good strength bilaterally.

## 2017-06-20 ENCOUNTER — EMERGENCY (EMERGENCY)
Facility: HOSPITAL | Age: 47
LOS: 1 days | Discharge: HOME | End: 2017-06-20
Attending: STUDENT IN AN ORGANIZED HEALTH CARE EDUCATION/TRAINING PROGRAM
Payer: COMMERCIAL

## 2017-06-20 VITALS
DIASTOLIC BLOOD PRESSURE: 80 MMHG | TEMPERATURE: 98 F | WEIGHT: 160.06 LBS | RESPIRATION RATE: 18 BRPM | OXYGEN SATURATION: 100 % | HEIGHT: 65 IN | SYSTOLIC BLOOD PRESSURE: 134 MMHG | HEART RATE: 79 BPM

## 2017-06-20 PROCEDURE — 93010 ELECTROCARDIOGRAM REPORT: CPT

## 2017-06-20 PROCEDURE — 99285 EMERGENCY DEPT VISIT HI MDM: CPT | Mod: 25

## 2017-06-20 NOTE — ED ADULT TRIAGE NOTE - CHIEF COMPLAINT QUOTE
patient states that she has chest pain x2 hours and shortness of breath for 24 hours. patient states that she was discharged 2 weeks ago after having a DVT and PE

## 2017-06-21 VITALS
DIASTOLIC BLOOD PRESSURE: 69 MMHG | OXYGEN SATURATION: 100 % | HEART RATE: 62 BPM | SYSTOLIC BLOOD PRESSURE: 121 MMHG | RESPIRATION RATE: 18 BRPM

## 2017-06-21 DIAGNOSIS — I26.99 OTHER PULMONARY EMBOLISM WITHOUT ACUTE COR PULMONALE: ICD-10-CM

## 2017-06-21 LAB
ALBUMIN SERPL ELPH-MCNC: 4.1 G/DL — SIGNIFICANT CHANGE UP (ref 3.3–5.2)
ALP SERPL-CCNC: 59 U/L — SIGNIFICANT CHANGE UP (ref 40–120)
ALT FLD-CCNC: 5 U/L — SIGNIFICANT CHANGE UP
ANION GAP SERPL CALC-SCNC: 13 MMOL/L — SIGNIFICANT CHANGE UP (ref 5–17)
AST SERPL-CCNC: 12 U/L — SIGNIFICANT CHANGE UP
BASOPHILS # BLD AUTO: 0 K/UL — SIGNIFICANT CHANGE UP (ref 0–0.2)
BASOPHILS NFR BLD AUTO: 0.3 % — SIGNIFICANT CHANGE UP (ref 0–2)
BILIRUB SERPL-MCNC: 0.2 MG/DL — LOW (ref 0.4–2)
BUN SERPL-MCNC: 15 MG/DL — SIGNIFICANT CHANGE UP (ref 8–20)
CALCIUM SERPL-MCNC: 8.9 MG/DL — SIGNIFICANT CHANGE UP (ref 8.6–10.2)
CHLORIDE SERPL-SCNC: 103 MMOL/L — SIGNIFICANT CHANGE UP (ref 98–107)
CO2 SERPL-SCNC: 22 MMOL/L — SIGNIFICANT CHANGE UP (ref 22–29)
CREAT SERPL-MCNC: 0.64 MG/DL — SIGNIFICANT CHANGE UP (ref 0.5–1.3)
EOSINOPHIL # BLD AUTO: 0.3 K/UL — SIGNIFICANT CHANGE UP (ref 0–0.5)
EOSINOPHIL NFR BLD AUTO: 4.1 % — SIGNIFICANT CHANGE UP (ref 0–6)
GLUCOSE SERPL-MCNC: 102 MG/DL — SIGNIFICANT CHANGE UP (ref 70–115)
HCG UR QL: NEGATIVE — SIGNIFICANT CHANGE UP
HCT VFR BLD CALC: 35.5 % — LOW (ref 37–47)
HGB BLD-MCNC: 11.9 G/DL — LOW (ref 12–16)
INR BLD: 1.22 RATIO — HIGH (ref 0.88–1.16)
LIDOCAIN IGE QN: 28 U/L — SIGNIFICANT CHANGE UP (ref 22–51)
LYMPHOCYTES # BLD AUTO: 2.2 K/UL — SIGNIFICANT CHANGE UP (ref 1–4.8)
LYMPHOCYTES # BLD AUTO: 35.4 % — SIGNIFICANT CHANGE UP (ref 20–55)
MCHC RBC-ENTMCNC: 28.5 PG — SIGNIFICANT CHANGE UP (ref 27–31)
MCHC RBC-ENTMCNC: 33.5 G/DL — SIGNIFICANT CHANGE UP (ref 32–36)
MCV RBC AUTO: 84.9 FL — SIGNIFICANT CHANGE UP (ref 81–99)
MONOCYTES # BLD AUTO: 0.5 K/UL — SIGNIFICANT CHANGE UP (ref 0–0.8)
MONOCYTES NFR BLD AUTO: 8.3 % — SIGNIFICANT CHANGE UP (ref 3–10)
NEUTROPHILS # BLD AUTO: 3.2 K/UL — SIGNIFICANT CHANGE UP (ref 1.8–8)
NEUTROPHILS NFR BLD AUTO: 51.7 % — SIGNIFICANT CHANGE UP (ref 37–73)
NT-PROBNP SERPL-SCNC: 54 PG/ML — SIGNIFICANT CHANGE UP (ref 0–300)
PLATELET # BLD AUTO: 222 K/UL — SIGNIFICANT CHANGE UP (ref 150–400)
POTASSIUM SERPL-MCNC: 4.1 MMOL/L — SIGNIFICANT CHANGE UP (ref 3.5–5.3)
POTASSIUM SERPL-SCNC: 4.1 MMOL/L — SIGNIFICANT CHANGE UP (ref 3.5–5.3)
PROT SERPL-MCNC: 7.4 G/DL — SIGNIFICANT CHANGE UP (ref 6.6–8.7)
PROTHROM AB SERPL-ACNC: 13.5 SEC — HIGH (ref 9.8–12.7)
RBC # BLD: 4.18 M/UL — LOW (ref 4.4–5.2)
RBC # FLD: 13.7 % — SIGNIFICANT CHANGE UP (ref 11–15.6)
SODIUM SERPL-SCNC: 138 MMOL/L — SIGNIFICANT CHANGE UP (ref 135–145)
TROPONIN T SERPL-MCNC: <0.01 NG/ML — SIGNIFICANT CHANGE UP (ref 0–0.06)
WBC # BLD: 6.3 K/UL — SIGNIFICANT CHANGE UP (ref 4.8–10.8)
WBC # FLD AUTO: 6.3 K/UL — SIGNIFICANT CHANGE UP (ref 4.8–10.8)

## 2017-06-21 PROCEDURE — 80053 COMPREHEN METABOLIC PANEL: CPT

## 2017-06-21 PROCEDURE — 71020: CPT | Mod: 26

## 2017-06-21 PROCEDURE — 71275 CT ANGIOGRAPHY CHEST: CPT

## 2017-06-21 PROCEDURE — 93005 ELECTROCARDIOGRAM TRACING: CPT

## 2017-06-21 PROCEDURE — 71046 X-RAY EXAM CHEST 2 VIEWS: CPT

## 2017-06-21 PROCEDURE — 85610 PROTHROMBIN TIME: CPT

## 2017-06-21 PROCEDURE — 81025 URINE PREGNANCY TEST: CPT

## 2017-06-21 PROCEDURE — 83880 ASSAY OF NATRIURETIC PEPTIDE: CPT

## 2017-06-21 PROCEDURE — 84484 ASSAY OF TROPONIN QUANT: CPT

## 2017-06-21 PROCEDURE — 85027 COMPLETE CBC AUTOMATED: CPT

## 2017-06-21 PROCEDURE — 70450 CT HEAD/BRAIN W/O DYE: CPT

## 2017-06-21 PROCEDURE — 83690 ASSAY OF LIPASE: CPT

## 2017-06-21 PROCEDURE — 70450 CT HEAD/BRAIN W/O DYE: CPT | Mod: 26

## 2017-06-21 PROCEDURE — 36415 COLL VENOUS BLD VENIPUNCTURE: CPT

## 2017-06-21 PROCEDURE — 99284 EMERGENCY DEPT VISIT MOD MDM: CPT | Mod: 25

## 2017-06-21 PROCEDURE — 96374 THER/PROPH/DIAG INJ IV PUSH: CPT | Mod: XU

## 2017-06-21 PROCEDURE — 71275 CT ANGIOGRAPHY CHEST: CPT | Mod: 26

## 2017-06-21 RX ORDER — ACETAMINOPHEN 500 MG
975 TABLET ORAL ONCE
Qty: 0 | Refills: 0 | Status: COMPLETED | OUTPATIENT
Start: 2017-06-21 | End: 2017-06-21

## 2017-06-21 RX ORDER — SODIUM CHLORIDE 9 MG/ML
3 INJECTION INTRAMUSCULAR; INTRAVENOUS; SUBCUTANEOUS ONCE
Qty: 0 | Refills: 0 | Status: COMPLETED | OUTPATIENT
Start: 2017-06-21 | End: 2017-06-21

## 2017-06-21 RX ORDER — SODIUM CHLORIDE 9 MG/ML
1000 INJECTION INTRAMUSCULAR; INTRAVENOUS; SUBCUTANEOUS ONCE
Qty: 0 | Refills: 0 | Status: COMPLETED | OUTPATIENT
Start: 2017-06-21 | End: 2017-06-21

## 2017-06-21 RX ORDER — METOCLOPRAMIDE HCL 10 MG
10 TABLET ORAL ONCE
Qty: 0 | Refills: 0 | Status: COMPLETED | OUTPATIENT
Start: 2017-06-21 | End: 2017-06-21

## 2017-06-21 RX ADMIN — Medication 10 MILLIGRAM(S): at 04:25

## 2017-06-21 RX ADMIN — SODIUM CHLORIDE 3 MILLILITER(S): 9 INJECTION INTRAMUSCULAR; INTRAVENOUS; SUBCUTANEOUS at 02:14

## 2017-06-21 RX ADMIN — SODIUM CHLORIDE 1000 MILLILITER(S): 9 INJECTION INTRAMUSCULAR; INTRAVENOUS; SUBCUTANEOUS at 02:14

## 2017-06-21 RX ADMIN — Medication 975 MILLIGRAM(S): at 04:25

## 2017-06-21 NOTE — CONSULT NOTE ADULT - ASSESSMENT
Patient is a 46 year old female with history of recent DVT / PE following knee surgery.  Patient discharged on eliquis with plans for hematology evaluation given family history of hypercoaguability.  Patient now experiencing worsening shortness of breath, chest pain and headache since starting work today.

## 2017-06-21 NOTE — ED PROVIDER NOTE - OBJECTIVE STATEMENT
47 y/o F with hx of hypothyroid, PE, DVT presents to the ED c/o chest pain today. Pt states that 2 weeks ago she was hospitalized for PE at [place], discharged a few days ago. She reports some residual SOB since her hospitalization and notes that her sx are exacerbated with exertion. Pt states that today she felt more fatigued than usual, feeling "winded". She mentions some associated blurred vision and increased burping. Pt took Oxycodone at 1900 which she received for her recent hx of DVT that was caused by meniscus surgery. Pt also reports chronic HA that she normally treats with Advil, unable to today because of her other medications, has had CT head that resulted negative. Denies fever, chills, abd pain, n/v/d. FHx of father with PE. Cardiothoracic is Dr. Porter. No further complaints at this time. 45 y/o F with hx of hypothyroid, PE, DVT presents to the ED c/o chest pain today. Pt states that 2 weeks ago she was hospitalized for PE at [place], discharged a few days ago. She reports some residual SOB since her hospitalization and notes that her sx are exacerbated with exertion. Pt states that today she felt more fatigued than usual, feeling "winded". She mentions some associated blurred vision and increased burping. Pt took Oxycodone at 1900 which she received for her recent hx of DVT that was caused by meniscus surgery. Pt also reports chronic HA that she normally treats with Advil, unable to today because of her other medications, has had CT head that resulted negative. Denies fever, chills, abd pain, n/v/d. FHx of father with PE. Cardiothoracic is Dr. Porter. Patient also relates that she just returned to work a noted increase fatigue since she has been resting. No further complaints at this time.

## 2017-06-21 NOTE — ED ADULT NURSE NOTE - OBJECTIVE STATEMENT
Patient presents to ER complaining of mild headache and  SOB, patient reports she was admitted 2 week ago to [facility] for DVT with PE, patient reports episode of chest pain that resolved, resp even/unlabored, lungs CTAB. Patient presents to ER complaining of mild headache and  SOB, patient reports she was admitted 2 week ago to [facility] for DVT with PE, patient reports episode of chest pain and SOB today, resp even/unlabored, lungs CTAB, patient currently on Eliquis.

## 2017-06-21 NOTE — CONSULT NOTE ADULT - PROBLEM SELECTOR RECOMMENDATION 9
PE diagnosed on 5/31.  Worsening shortness of breath and chest pain now that patient is more active at home.  BNP and troponin not elevated presently, vital signs are within normal limit on room air.    Please obtain CTA of chest to evaluate for increase clot burden or new PE.  If there is concern for new PE or increased clot burden, please obtain TTE to evaluate for heart strain.    Above plan discussed with Dr. Lama PE diagnosed on 5/31.  Worsening shortness of breath and chest pain now that patient is more active at home.  BNP and troponin not elevated presently, vital signs are within normal limit on room air.    Please obtain CTA of chest to evaluate for increase clot burden or new PE.  If there is concern for new PE or increased clot burden, please obtain TTE to evaluate for heart strain.    Above plan discussed with Dr. Lama      ** Document updated to include results of CTA of chest, there is no PE present.***

## 2017-06-21 NOTE — CONSULT NOTE ADULT - SUBJECTIVE AND OBJECTIVE BOX
CT Surgery Consult Note:    45 y/o F with hx of DVT and PE following Knee surgery in early May 2017, Two weeks ago patient was hospitalized for PE at [place], TTE revealed no heart strain and BNP and troponins not elevated.  Patient was discharged a few days ago on eliquis, with plans in place to see hematology to evaluate for hypercoaguability.  Patient reports that she was home for one week and returned to work today, and for past 24 hours has had worse chest pain and shortness of breath, patient states that even at rest she feels that she cannot get enough air, and that her chest hurts.  She reports some residual SOB since her hospitalization and notes that her sx are exacerbated with exertion.     Cardiothoracic surgery consulted for assistance in diagnosis of pulmonary embolism and assistance with patient's management.       Subjective:      REVIEW OF SYSTEMS    General:  Hospitalized recently for PE and DVT as above.	  	  HEENT:	 Admits to headache    Respiratory and Thorax: Admits to shortness of breath, worse with exertion. and chest pain not reproducible with palpation.  	  Cardiovascular:	Denies palpitaitons    Gastrointestinal:	 admits to having increased belching    Genitourinary:	denies    Musculoskeletal: denies	    Neurological:	Headache, as above    Hematology/Lymphatics: Family history of factor 5, awaiting hematology as outpatient.  on Eliquis	        Objective:   Vital Signs Last 24 Hrs  T(C): 36.8, Max: 36.8 (06-20 @ 22:12)  T(F): 98.2, Max: 98.2 (06-20 @ 22:12)  HR: 61 (61 - 79)  BP: 119/71 (119/71 - 134/80)  RR: 18 (18 - 18)  SpO2: 100% (100% - 100%) on room air      PHYSICAL EXAM:      Constitutional: Resting comfortably in stretcher    Neck: Trachea midline, no apparent jvd    Respiratory: CTA b/l    Cardiovascular:  S1, S2 no murmur.     Gastrointestinal: Soft, nontender    Genitourinary: patient refused    Extremities: mild RLE swelling, mildly tender    Vascular: +PT/DP pulses        CXR: No apparent infiltrate (my read)    CTA (5/31/2017)  1.  Pulmonary emboli are present within the distal right main pulmonary   artery with extension into the middle and lower lobar pulmonary arteries.  2.  Slightly increased size of the right ventricular luminal diameter   with reflux of contrast into the hepatic veins, possibly evidence of   right heart strain. Correlation with echocardiogram is recommended.    CT Scan: 6/21 CTA Pending CT Surgery Consult Note:    45 y/o F with hx of DVT and PE following Knee surgery in early May 2017, Two weeks ago patient was hospitalized for PE at [place], TTE revealed no heart strain and BNP and troponins not elevated.  Patient was discharged a few days ago on eliquis, with plans in place to see hematology to evaluate for hypercoaguability.  Patient reports that she was home for one week and returned to work today, and for past 24 hours has had worse chest pain and shortness of breath, patient states that even at rest she feels that she cannot get enough air, and that her chest hurts.  She reports some residual SOB since her hospitalization and notes that her sx are exacerbated with exertion.     Cardiothoracic surgery consulted for assistance in diagnosis of pulmonary embolism and assistance with patient's management.       Subjective:      REVIEW OF SYSTEMS    General:  Hospitalized recently for PE and DVT as above.	  	  HEENT:	 Admits to headache    Respiratory and Thorax: Admits to shortness of breath, worse with exertion. and chest pain not reproducible with palpation.  	  Cardiovascular:	Denies palpitaitons    Gastrointestinal:	 admits to having increased belching    Genitourinary:	denies    Musculoskeletal: denies	    Neurological:	Headache, as above    Hematology/Lymphatics: Family history of factor 5, awaiting hematology as outpatient.  on Eliquis	        Objective:   Vital Signs Last 24 Hrs  T(C): 36.8, Max: 36.8 (06-20 @ 22:12)  T(F): 98.2, Max: 98.2 (06-20 @ 22:12)  HR: 61 (61 - 79)  BP: 119/71 (119/71 - 134/80)  RR: 18 (18 - 18)  SpO2: 100% (100% - 100%) on room air      PHYSICAL EXAM:      Constitutional: Resting comfortably in stretcher    Neck: Trachea midline, no apparent jvd    Respiratory: CTA b/l    Cardiovascular:  S1, S2 no murmur.     Gastrointestinal: Soft, nontender    Genitourinary: patient refused    Extremities: mild RLE swelling, mildly tender    Vascular: +PT/DP pulses        CXR: No apparent infiltrate (my read)    CTA (5/31/2017)  1.  Pulmonary emboli are present within the distal right main pulmonary   artery with extension into the middle and lower lobar pulmonary arteries.  2.  Slightly increased size of the right ventricular luminal diameter   with reflux of contrast into the hepatic veins, possibly evidence of   right heart strain. Correlation with echocardiogram is recommended.    CT Scan: 6/21 CTA  o evidence of pulmonary embolism.  Lobar and segmental pulmonary emboli   to the right middle and lower lobe seen on 05/30/2017 are nolonger   present.  No focal infiltrate, pleural effusion or pneumothorax.

## 2017-06-21 NOTE — ED PROVIDER NOTE - PROGRESS NOTE DETAILS
contacted CT surgeon for consultation CT surgery recommended repeating CT. Results are as noted. Patient is feeling better but still has mild headache. She declines further medication. Symptoms likely secondary to mild deconditioning.

## 2018-02-14 ENCOUNTER — RESULT REVIEW (OUTPATIENT)
Age: 48
End: 2018-02-14

## 2019-06-19 RX ORDER — LEVOTHYROXINE SODIUM 0.05 MG/1
50 TABLET ORAL DAILY
Qty: 90 | Refills: 3 | Status: ACTIVE | COMMUNITY
Start: 2019-06-19 | End: 1900-01-01

## 2019-06-24 ENCOUNTER — RX CHANGE (OUTPATIENT)
Age: 49
End: 2019-06-24

## 2019-06-24 RX ORDER — LEVOTHYROXINE SODIUM 50 UG/1
50 TABLET ORAL DAILY
Qty: 90 | Refills: 3 | Status: ACTIVE | COMMUNITY
Start: 2019-06-24 | End: 1900-01-01

## 2019-07-25 DIAGNOSIS — N76.2 ACUTE VULVITIS: ICD-10-CM

## 2019-07-25 RX ORDER — BETAMETHASONE DIPROPIONATE 0.5 MG/G
0.05 CREAM TOPICAL DAILY
Qty: 1 | Refills: 2 | Status: ACTIVE | COMMUNITY
Start: 2019-07-25 | End: 1900-01-01

## 2019-11-27 DIAGNOSIS — J32.9 CHRONIC SINUSITIS, UNSPECIFIED: ICD-10-CM

## 2019-11-27 RX ORDER — AZITHROMYCIN 250 MG/1
250 TABLET, FILM COATED ORAL
Qty: 1 | Refills: 0 | Status: ACTIVE | COMMUNITY
Start: 2019-11-27 | End: 1900-01-01

## 2020-02-22 RX ORDER — RIVAROXABAN 20 MG/1
20 TABLET, FILM COATED ORAL DAILY
Qty: 90 | Refills: 3 | Status: ACTIVE | COMMUNITY
Start: 2020-02-22 | End: 1900-01-01

## 2020-04-09 RX ORDER — PREDNISONE 20 MG/1
20 TABLET ORAL TWICE DAILY
Qty: 120 | Refills: 2 | Status: ACTIVE | COMMUNITY
Start: 2020-03-02 | End: 1900-01-01

## 2020-05-14 DIAGNOSIS — Z86.711 PERSONAL HISTORY OF PULMONARY EMBOLISM: ICD-10-CM

## 2020-05-14 DIAGNOSIS — R10.9 UNSPECIFIED ABDOMINAL PAIN: ICD-10-CM

## 2020-05-14 DIAGNOSIS — Z86.39 PERSONAL HISTORY OF OTHER ENDOCRINE, NUTRITIONAL AND METABOLIC DISEASE: ICD-10-CM

## 2020-05-14 DIAGNOSIS — Z86.001 PERSONAL HISTORY OF IN-SITU NEOPLASM OF CERVIX UTERI: ICD-10-CM

## 2020-05-14 DIAGNOSIS — Z86.718 PERSONAL HISTORY OF OTHER VENOUS THROMBOSIS AND EMBOLISM: ICD-10-CM

## 2020-05-15 ENCOUNTER — APPOINTMENT (OUTPATIENT)
Dept: CT IMAGING | Facility: CLINIC | Age: 50
End: 2020-05-15

## 2020-05-15 ENCOUNTER — RESULT REVIEW (OUTPATIENT)
Age: 50
End: 2020-05-15

## 2020-05-15 ENCOUNTER — TRANSCRIPTION ENCOUNTER (OUTPATIENT)
Age: 50
End: 2020-05-15

## 2020-05-15 ENCOUNTER — OUTPATIENT (OUTPATIENT)
Dept: OUTPATIENT SERVICES | Facility: HOSPITAL | Age: 50
LOS: 1 days | End: 2020-05-15
Payer: COMMERCIAL

## 2020-05-15 DIAGNOSIS — R10.9 UNSPECIFIED ABDOMINAL PAIN: ICD-10-CM

## 2020-05-15 DIAGNOSIS — Z00.00 ENCOUNTER FOR GENERAL ADULT MEDICAL EXAMINATION WITHOUT ABNORMAL FINDINGS: ICD-10-CM

## 2020-05-15 PROCEDURE — 74176 CT ABD & PELVIS W/O CONTRAST: CPT

## 2020-05-15 PROCEDURE — 74176 CT ABD & PELVIS W/O CONTRAST: CPT | Mod: 26

## 2020-05-16 LAB
APPEARANCE: CLEAR
BACTERIA: NEGATIVE
BILIRUBIN URINE: NEGATIVE
BLOOD URINE: ABNORMAL
COLOR: COLORLESS
GLUCOSE QUALITATIVE U: NEGATIVE
HYALINE CASTS: 0 /LPF
KETONES URINE: NEGATIVE
LEUKOCYTE ESTERASE URINE: NEGATIVE
MICROSCOPIC-UA: NORMAL
NITRITE URINE: NEGATIVE
PH URINE: 7
PROTEIN URINE: NEGATIVE
RED BLOOD CELLS URINE: 1 /HPF
SPECIFIC GRAVITY URINE: 1
SQUAMOUS EPITHELIAL CELLS: 1 /HPF
UROBILINOGEN URINE: NORMAL
WHITE BLOOD CELLS URINE: 0 /HPF

## 2020-05-17 LAB — BACTERIA UR CULT: NORMAL

## 2020-05-19 PROBLEM — Z86.711 HISTORY OF PULMONARY EMBOLISM: Status: RESOLVED | Noted: 2020-05-19 | Resolved: 2020-05-19

## 2020-05-19 PROBLEM — R10.9 FLANK PAIN: Status: ACTIVE | Noted: 2020-05-15

## 2020-05-19 PROBLEM — Z86.718 HISTORY OF DEEP VENOUS THROMBOSIS: Status: RESOLVED | Noted: 2020-05-19 | Resolved: 2020-05-19

## 2020-05-19 PROBLEM — Z86.39 HISTORY OF HYPOTHYROIDISM: Status: RESOLVED | Noted: 2020-05-19 | Resolved: 2020-05-19

## 2020-05-19 PROBLEM — Z86.001 HISTORY OF ADENOCARCINOMA IN SITU OF CERVIX: Status: RESOLVED | Noted: 2020-05-19 | Resolved: 2020-05-19

## 2020-05-27 LAB — URINE CYTOLOGY: NORMAL

## 2020-06-17 NOTE — PHYSICAL EXAM
[Alert] : alert [Awake] : awake [Soft] : soft [Soft, Nontender] : the abdomen was soft and nontender [Right] : right CVA tenderness present [Oriented x3] : oriented to person, place, and time [Normal] : external genitalia [Uterine Adnexae] : were not tender and not enlarged [Mass] : no breast mass [Acute Distress] : no acute distress [Nipple Discharge] : no nipple discharge [Axillary LAD] : no axillary lymphadenopathy [Tender] : non tender

## 2020-09-08 DIAGNOSIS — F41.9 ANXIETY DISORDER, UNSPECIFIED: ICD-10-CM

## 2020-09-08 RX ORDER — ALPRAZOLAM 0.5 MG/1
0.5 TABLET ORAL
Qty: 30 | Refills: 0 | Status: ACTIVE | COMMUNITY
Start: 2020-09-08 | End: 1900-01-01

## 2020-12-23 DIAGNOSIS — Z80.3 FAMILY HISTORY OF MALIGNANT NEOPLASM OF BREAST: ICD-10-CM

## 2020-12-23 DIAGNOSIS — Z12.39 ENCOUNTER FOR OTHER SCREENING FOR MALIGNANT NEOPLASM OF BREAST: ICD-10-CM

## 2020-12-31 ENCOUNTER — APPOINTMENT (OUTPATIENT)
Dept: OBGYN | Facility: CLINIC | Age: 50
End: 2020-12-31

## 2021-03-23 ENCOUNTER — APPOINTMENT (OUTPATIENT)
Dept: OBGYN | Facility: CLINIC | Age: 51
End: 2021-03-23
Payer: COMMERCIAL

## 2021-03-23 VITALS
SYSTOLIC BLOOD PRESSURE: 120 MMHG | HEIGHT: 65 IN | DIASTOLIC BLOOD PRESSURE: 70 MMHG | WEIGHT: 155 LBS | BODY MASS INDEX: 25.83 KG/M2

## 2021-03-23 LAB
BILIRUB UR QL STRIP: NORMAL
CLARITY UR: CLEAR
COLLECTION METHOD: NORMAL
GLUCOSE UR-MCNC: NORMAL
HCG UR QL: 0.2 EU/DL
HCG UR QL: NEGATIVE
HGB UR QL STRIP.AUTO: NORMAL
KETONES UR-MCNC: NORMAL
LEUKOCYTE ESTERASE UR QL STRIP: NORMAL
NITRITE UR QL STRIP: NORMAL
PH UR STRIP: 6.5
PROT UR STRIP-MCNC: NORMAL
SP GR UR STRIP: 1.02

## 2021-03-23 PROCEDURE — 99072 ADDL SUPL MATRL&STAF TM PHE: CPT

## 2021-03-23 PROCEDURE — 99386 PREV VISIT NEW AGE 40-64: CPT | Mod: 25

## 2021-03-23 PROCEDURE — 81025 URINE PREGNANCY TEST: CPT

## 2021-03-23 PROCEDURE — 81003 URINALYSIS AUTO W/O SCOPE: CPT | Mod: QW

## 2021-03-23 PROCEDURE — 99213 OFFICE O/P EST LOW 20 MIN: CPT | Mod: 25

## 2021-03-23 PROCEDURE — 58100 BIOPSY OF UTERUS LINING: CPT

## 2021-03-24 LAB — HPV HIGH+LOW RISK DNA PNL CVX: NOT DETECTED

## 2021-03-26 ENCOUNTER — ASOB RESULT (OUTPATIENT)
Age: 51
End: 2021-03-26

## 2021-03-26 ENCOUNTER — APPOINTMENT (OUTPATIENT)
Dept: OBGYN | Facility: CLINIC | Age: 51
End: 2021-03-26
Payer: COMMERCIAL

## 2021-03-26 LAB — CYTOLOGY CVX/VAG DOC THIN PREP: ABNORMAL

## 2021-03-26 PROCEDURE — 99072 ADDL SUPL MATRL&STAF TM PHE: CPT

## 2021-03-26 PROCEDURE — 76830 TRANSVAGINAL US NON-OB: CPT

## 2021-03-26 PROCEDURE — 93975 VASCULAR STUDY: CPT

## 2021-03-30 LAB — CORE LAB BIOPSY: NORMAL

## 2021-03-31 RX ORDER — MISOPROSTOL 200 UG/1
200 TABLET ORAL
Qty: 2 | Refills: 0 | Status: ACTIVE | COMMUNITY
Start: 2021-03-31 | End: 1900-01-01

## 2021-05-10 ENCOUNTER — APPOINTMENT (OUTPATIENT)
Dept: OBGYN | Facility: CLINIC | Age: 51
End: 2021-05-10
Payer: COMMERCIAL

## 2021-05-10 VITALS — SYSTOLIC BLOOD PRESSURE: 120 MMHG | DIASTOLIC BLOOD PRESSURE: 78 MMHG

## 2021-05-10 DIAGNOSIS — N88.2 STRICTURE AND STENOSIS OF CERVIX UTERI: ICD-10-CM

## 2021-05-10 DIAGNOSIS — N93.8 OTHER SPECIFIED ABNORMAL UTERINE AND VAGINAL BLEEDING: ICD-10-CM

## 2021-05-10 PROCEDURE — 58100 BIOPSY OF UTERUS LINING: CPT

## 2021-05-10 PROCEDURE — 99072 ADDL SUPL MATRL&STAF TM PHE: CPT

## 2021-05-10 RX ORDER — MISOPROSTOL 200 UG/1
200 TABLET ORAL
Qty: 2 | Refills: 0 | Status: ACTIVE | COMMUNITY
Start: 2021-05-10 | End: 1900-01-01

## 2021-05-18 LAB — CORE LAB BIOPSY: NORMAL

## 2021-07-26 NOTE — DISCHARGE NOTE ADULT - PATIENT PORTAL LINK FT
Patient states intermittent mid lower chest pain when taking a deep breathe. States he has been experiencing more stress at home including losing his job.  Positive for covid in December
The patient is cleared for discharge per Emergency Department physician. Discharge instructions were reviewed with patient including when and how to follow up with healthcare providers and when to seek emergency care.  Medication use was reviewed and presc
“You can access the FollowHealth Patient Portal, offered by [facility], by registering with the following website: [email or web address]”

## 2021-08-04 ENCOUNTER — APPOINTMENT (OUTPATIENT)
Dept: VASCULAR SURGERY | Facility: CLINIC | Age: 51
End: 2021-08-04
Payer: COMMERCIAL

## 2021-08-04 VITALS
OXYGEN SATURATION: 100 % | BODY MASS INDEX: 26.14 KG/M2 | HEIGHT: 64.5 IN | HEART RATE: 68 BPM | SYSTOLIC BLOOD PRESSURE: 109 MMHG | WEIGHT: 155 LBS | DIASTOLIC BLOOD PRESSURE: 71 MMHG | TEMPERATURE: 97.6 F

## 2021-08-04 PROCEDURE — 99213 OFFICE O/P EST LOW 20 MIN: CPT

## 2021-10-11 NOTE — HISTORY OF PRESENT ILLNESS
[FreeTextEntry1] : 51 y/o woman with history of DVT and Factor V mutation, comes for evaluation of right leg edema and pain. No ulceration or cellulitis. No SVT. Has been using support stockings.

## 2021-12-29 ENCOUNTER — APPOINTMENT (OUTPATIENT)
Dept: OBGYN | Facility: CLINIC | Age: 51
End: 2021-12-29

## 2022-01-05 ENCOUNTER — APPOINTMENT (OUTPATIENT)
Dept: OBGYN | Facility: CLINIC | Age: 52
End: 2022-01-05

## 2022-01-31 ENCOUNTER — APPOINTMENT (OUTPATIENT)
Dept: OBGYN | Facility: CLINIC | Age: 52
End: 2022-01-31

## 2022-02-21 ENCOUNTER — APPOINTMENT (OUTPATIENT)
Dept: OBGYN | Facility: CLINIC | Age: 52
End: 2022-02-21

## 2023-07-13 ENCOUNTER — OFFICE (OUTPATIENT)
Dept: URBAN - METROPOLITAN AREA CLINIC 12 | Facility: CLINIC | Age: 53
Setting detail: OPHTHALMOLOGY
End: 2023-07-13
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
## 2023-07-13 PROCEDURE — NO SHOW FE NO SHOW FEE: Performed by: OPHTHALMOLOGY

## 2023-09-05 ENCOUNTER — APPOINTMENT (OUTPATIENT)
Dept: OBGYN | Facility: CLINIC | Age: 53
End: 2023-09-05
Payer: COMMERCIAL

## 2023-09-05 VITALS
HEIGHT: 64.5 IN | BODY MASS INDEX: 25.64 KG/M2 | WEIGHT: 152 LBS | SYSTOLIC BLOOD PRESSURE: 111 MMHG | DIASTOLIC BLOOD PRESSURE: 66 MMHG

## 2023-09-05 DIAGNOSIS — D21.9 BENIGN NEOPLASM OF CONNECTIVE AND OTHER SOFT TISSUE, UNSPECIFIED: ICD-10-CM

## 2023-09-05 DIAGNOSIS — Z01.411 ENCOUNTER FOR GYNECOLOGICAL EXAMINATION (GENERAL) (ROUTINE) WITH ABNORMAL FINDINGS: ICD-10-CM

## 2023-09-05 DIAGNOSIS — D68.51 ACTIVATED PROTEIN C RESISTANCE: ICD-10-CM

## 2023-09-05 DIAGNOSIS — N94.3 PREMENSTRUAL TENSION SYNDROME: ICD-10-CM

## 2023-09-05 PROCEDURE — 82270 OCCULT BLOOD FECES: CPT

## 2023-09-05 PROCEDURE — 99396 PREV VISIT EST AGE 40-64: CPT

## 2023-09-05 NOTE — PLAN
[FreeTextEntry1] : 52 year old female presents for routine gyn exam - AIS, s/p cone bx x2, fibroid, mood changes pms BSE taught Breast and pelvic exam performed Pap/HPV conducted Advised to schedule mammogram and breast sonogram. Rx given, due now. 2020 colonoscopy, Pt has a colonoscopy scheduled for Monday.   Fibroid Advised to schedule pelvic sono now  Mood changes Discussed w/ pt sarafem. Reviewed R/B/A. Rx given. If it does not improve in 2 months advised to call MD to change meds  RTO in 1 year or PRN

## 2023-09-05 NOTE — HISTORY OF PRESENT ILLNESS
[Patient reported colonoscopy was normal] : Patient reported colonoscopy was normal [FreeTextEntry1] : 2023. ZHAO WONG 52 year old female  LMP 23. PMH AIS, s/p cone bx x2, fibroid. She presents for an annual gyn exam   She feels well and offers no complaints. She reports menses come every 21 days and lasts for 7 days. Menses are not too painful, not heavy. She reports severe mood changes and irritability, denies hot flashes or low libido. She denies vaginal bleeding, abnormal vaginal discharge or vaginitis sxs. No urinary complaints. BM is normal per patient, no bloody stool. She denies abdominal and pelvic pain.  No new medical conditions, medications or surgeries.  Pt had a negative endometrial bx in .   GynHx: AIS, fibroid PMH: hypothyroid, Lyme disease, Factor V Leiden SHx: c/s x2, knee surgery, cone bx x2 Allergies: PNC Meds: Xarelto, Synthroid FHx: breast CA - sister, who was negative for full genetic testing panel. Denies FHx of ovarian, uterine or colon cancer.  [ColonoscopyDate] : 2020

## 2023-09-06 RX ORDER — FLUOXETINE HYDROCHLORIDE 10 MG/1
10 CAPSULE ORAL
Qty: 90 | Refills: 0 | Status: ACTIVE | COMMUNITY
Start: 2023-09-05 | End: 1900-01-01

## 2023-09-07 LAB — HPV HIGH+LOW RISK DNA PNL CVX: NOT DETECTED

## 2023-09-08 ENCOUNTER — APPOINTMENT (OUTPATIENT)
Dept: OBGYN | Facility: CLINIC | Age: 53
End: 2023-09-08

## 2023-09-11 LAB — CYTOLOGY CVX/VAG DOC THIN PREP: NORMAL

## 2024-11-22 ENCOUNTER — ASOB RESULT (OUTPATIENT)
Age: 54
End: 2024-11-22

## 2024-11-22 ENCOUNTER — APPOINTMENT (OUTPATIENT)
Dept: OBGYN | Facility: CLINIC | Age: 54
End: 2024-11-22
Payer: COMMERCIAL

## 2024-11-22 VITALS — DIASTOLIC BLOOD PRESSURE: 80 MMHG | SYSTOLIC BLOOD PRESSURE: 122 MMHG

## 2024-11-22 DIAGNOSIS — N93.8 OTHER SPECIFIED ABNORMAL UTERINE AND VAGINAL BLEEDING: ICD-10-CM

## 2024-11-22 DIAGNOSIS — N76.2 ACUTE VULVITIS: ICD-10-CM

## 2024-11-22 DIAGNOSIS — D68.51 ACTIVATED PROTEIN C RESISTANCE: ICD-10-CM

## 2024-11-22 DIAGNOSIS — D21.9 BENIGN NEOPLASM OF CONNECTIVE AND OTHER SOFT TISSUE, UNSPECIFIED: ICD-10-CM

## 2024-11-22 LAB — HCG UR QL: NEGATIVE

## 2024-11-22 PROCEDURE — 99459 PELVIC EXAMINATION: CPT | Mod: NC

## 2024-11-22 PROCEDURE — 99214 OFFICE O/P EST MOD 30 MIN: CPT

## 2024-11-22 PROCEDURE — 76830 TRANSVAGINAL US NON-OB: CPT

## 2024-11-22 PROCEDURE — 81025 URINE PREGNANCY TEST: CPT

## 2024-11-25 LAB
BASOPHILS # BLD AUTO: 0.05 K/UL
BASOPHILS NFR BLD AUTO: 1 %
BV BACTERIA RRNA VAG QL NAA+PROBE: DETECTED
C GLABRATA RNA VAG QL NAA+PROBE: NOT DETECTED
C TRACH RRNA SPEC QL NAA+PROBE: NOT DETECTED
CANDIDA RRNA VAG QL PROBE: NOT DETECTED
EOSINOPHIL # BLD AUTO: 0.07 K/UL
EOSINOPHIL NFR BLD AUTO: 1.4 %
FSH SERPL-MCNC: 51.8 IU/L
HBV SURFACE AG SER QL: NONREACTIVE
HCT VFR BLD CALC: 40.2 %
HCV AB SER QL: NONREACTIVE
HCV S/CO RATIO: 0.11 S/CO
HGB BLD-MCNC: 12.8 G/DL
HIV1+2 AB SPEC QL IA.RAPID: NONREACTIVE
IMM GRANULOCYTES NFR BLD AUTO: 0.2 %
LH SERPL-ACNC: 49.9 IU/L
LYMPHOCYTES # BLD AUTO: 1.32 K/UL
LYMPHOCYTES NFR BLD AUTO: 25.6 %
MAN DIFF?: NORMAL
MCHC RBC-ENTMCNC: 28.7 PG
MCHC RBC-ENTMCNC: 31.8 G/DL
MCV RBC AUTO: 90.1 FL
MONOCYTES # BLD AUTO: 0.43 K/UL
MONOCYTES NFR BLD AUTO: 8.3 %
N GONORRHOEA RRNA SPEC QL NAA+PROBE: NOT DETECTED
NEUTROPHILS # BLD AUTO: 3.28 K/UL
NEUTROPHILS NFR BLD AUTO: 63.5 %
PLATELET # BLD AUTO: 207 K/UL
PROLACTIN SERPL-MCNC: 15.8 NG/ML
RBC # BLD: 4.46 M/UL
RBC # FLD: 13.2 %
T PALLIDUM AB SER QL IA: NEGATIVE
T VAGINALIS RRNA SPEC QL NAA+PROBE: NOT DETECTED
T4 FREE SERPL-MCNC: 1.7 NG/DL
TSH SERPL-ACNC: 0.24 UIU/ML
WBC # FLD AUTO: 5.16 K/UL

## 2024-11-26 DIAGNOSIS — B96.89 ACUTE VAGINITIS: ICD-10-CM

## 2024-11-26 DIAGNOSIS — N76.0 ACUTE VAGINITIS: ICD-10-CM

## 2024-11-26 RX ORDER — CLINDAMYCIN PHOSPHATE 100 MG/5G
2 CREAM VAGINAL DAILY
Qty: 1 | Refills: 0 | Status: ACTIVE | COMMUNITY
Start: 2024-11-26 | End: 1900-01-01

## 2024-12-02 RX ORDER — METRONIDAZOLE 7.5 MG/G
0.75 GEL VAGINAL
Qty: 1 | Refills: 0 | Status: ACTIVE | COMMUNITY
Start: 2024-12-02 | End: 1900-01-01

## 2024-12-20 ENCOUNTER — APPOINTMENT (OUTPATIENT)
Dept: OBGYN | Facility: CLINIC | Age: 54
End: 2024-12-20
Payer: COMMERCIAL

## 2024-12-20 ENCOUNTER — ASOB RESULT (OUTPATIENT)
Age: 54
End: 2024-12-20

## 2024-12-20 LAB — HCG UR QL: NEGATIVE

## 2024-12-20 PROCEDURE — 76831 ECHO EXAM UTERUS: CPT

## 2024-12-20 PROCEDURE — 81025 URINE PREGNANCY TEST: CPT

## 2024-12-20 PROCEDURE — 58340 CATHETER FOR HYSTEROGRAPHY: CPT

## 2024-12-20 PROCEDURE — ZZZZZ: CPT

## 2024-12-30 ENCOUNTER — NON-APPOINTMENT (OUTPATIENT)
Age: 54
End: 2024-12-30

## 2025-01-21 ENCOUNTER — NON-APPOINTMENT (OUTPATIENT)
Age: 55
End: 2025-01-21

## 2025-01-21 ENCOUNTER — EMERGENCY (EMERGENCY)
Facility: HOSPITAL | Age: 55
LOS: 1 days | Discharge: HOME | End: 2025-01-21
Attending: EMERGENCY MEDICINE
Payer: COMMERCIAL

## 2025-01-21 VITALS
WEIGHT: 149.91 LBS | DIASTOLIC BLOOD PRESSURE: 81 MMHG | OXYGEN SATURATION: 100 % | HEIGHT: 64.5 IN | HEART RATE: 74 BPM | TEMPERATURE: 98 F | RESPIRATION RATE: 18 BRPM | SYSTOLIC BLOOD PRESSURE: 123 MMHG

## 2025-01-21 PROCEDURE — 73610 X-RAY EXAM OF ANKLE: CPT

## 2025-01-21 PROCEDURE — 29515 APPLICATION SHORT LEG SPLINT: CPT | Mod: RT

## 2025-01-21 PROCEDURE — 73590 X-RAY EXAM OF LOWER LEG: CPT | Mod: 26,RT

## 2025-01-21 PROCEDURE — 73610 X-RAY EXAM OF ANKLE: CPT | Mod: 26,RT

## 2025-01-21 PROCEDURE — 99284 EMERGENCY DEPT VISIT MOD MDM: CPT | Mod: 25

## 2025-01-21 PROCEDURE — 73562 X-RAY EXAM OF KNEE 3: CPT

## 2025-01-21 PROCEDURE — 73630 X-RAY EXAM OF FOOT: CPT | Mod: 26,RT

## 2025-01-21 PROCEDURE — 73630 X-RAY EXAM OF FOOT: CPT

## 2025-01-21 PROCEDURE — 73590 X-RAY EXAM OF LOWER LEG: CPT

## 2025-01-21 PROCEDURE — 73562 X-RAY EXAM OF KNEE 3: CPT | Mod: 26,RT

## 2025-01-21 NOTE — ED PROVIDER NOTE - WR ORDER STATUS 2
Pt endorsed to me from Dr. Meek.  Patient noted to be tremulous and tachycardic, HR noted to be 100s.  CIWA noted to be 10 on re-eval from 6.  Will start CIWA taper, and will admit to medicine.
Resulted

## 2025-01-21 NOTE — ED PROVIDER NOTE - MUSCULOSKELETAL, MLM
RIght hip NT full ROM, right knee + tender over patella and head of tibia.  Minimal right tib fib tenderness throughout.  + edema to right ankle tender diffusely and unable to range secondary to pain.  Right foot mild diffuse tenderness with questionable tenderness over head of 5th metatarsal

## 2025-01-21 NOTE — ED PROVIDER NOTE - PATIENT PORTAL LINK FT
You can access the FollowMyHealth Patient Portal offered by [facility] by registering at the following website: [email or web address]. By joining [email or web address] FollowMyHealth portal, you will also be able to view your health information using other applications (apps) compatible with our system.

## 2025-01-21 NOTE — ED PROVIDER NOTE - CARE PROVIDERS DIRECT ADDRESSES
[email or web address]

## 2025-01-21 NOTE — ED PROVIDER NOTE - NSICDXPASTMEDICALHX_GEN_ALL_CORE_FT
PAST MEDICAL HISTORY:  Cervical dysplasia '     HPV in female '     Hypothyroidism dx: ' .  medically managed    Incompetence of cervix     Iron deficiency anemia Medically managed    Missed  '  and 2010 ( 1st trimester)    Normal IUP (intrauterine pregnancy) on prenatal ultrasound 12 weeks. E.D.C. 13

## 2025-01-21 NOTE — ED PROVIDER NOTE - CONSTITUTIONAL, MLM
Stage 9: Number Of Blocks?: 0 normal... Well appearing, awake, alert, oriented to person, place, time/situation and in no apparent distress.

## 2025-01-21 NOTE — ED PROVIDER NOTE - NSFOLLOWUPINSTRUCTIONS_ED_ALL_ED_FT
Nonweightbearing use crutches as instructed  Motrin 600 mg every 6 hours as needed for pain  Tylenol can be added to this do not take more than 4 g in a 24-hour period  Follow-up with orthopedics as an outpatient  If you have any worsening pain swelling numbness tingling or any new or worsening complaints please come back to the emergency department immediately  Continue all your medications as prescribed  Keep leg elevated if possible

## 2025-01-21 NOTE — ED PROVIDER NOTE - ATTENDING APP SHARED VISIT CONTRIBUTION OF CARE
Is a 54-year-old female history of DVT PEs of her right leg due to factor V Leiden on Xarelto [place] the [place] injured her right ankle inversion injury complaining of pain to her right medial aspect of her ankle mild swelling no deformity likely sprain no other obvious obvious injuries at this time x-rays ordered rule out fracture denies head strike, no LOC.

## 2025-01-21 NOTE — ED PROVIDER NOTE - NSICDXFAMILYHX_GEN_ALL_CORE_FT
FAMILY HISTORY:  Father  Still living? Unknown  Family history of DVT, Age at diagnosis: Age Unknown

## 2025-01-21 NOTE — ED ADULT NURSE NOTE - NSICDXPASTSURGICALHX_GEN_ALL_CORE_FT
PAST SURGICAL HISTORY:  History of  '     Hx of dilation and curettage For Missed  ( 1st Trimester):  '  and 2010    S/P cone biopsy of cervix ' . ( twice)

## 2025-01-21 NOTE — ED PROVIDER NOTE - OBJECTIVE STATEMENT
54-year-old female history of DVT/PE secondary to factor V Leiden on Xarelto presents to the emergency department with right ankle pain and swelling.  [place] was at the airport when she [place] a step and inverted her right ankle.  Patient fell to the ground denies hitting her head no neck or back pain.  Pain located solely in the right lower extremity primarily at the ankle however knee is also painful.  Patient has not bear weight since the fall.  Patient has not taken anything for the pain and is declining pain meds at this time.  Patient's sister works as an OB/GYN in this hospital and was requesting [place].

## 2025-01-21 NOTE — ED ADULT NURSE NOTE - OBJECTIVE STATEMENT
54-year-old female history of DVT/PE secondary to factor V Leiden on Xarelto presents to the emergency department with right ankle pain and swelling.  [place] she [place] a step and inverted her right ankle.  Patient fell to the ground denies hitting her head no neck or back pain.  Pain located solely in the right lower extremity primarily at the ankle however knee is also painful.  Patient has not bear weight since the fall.  Patient has not taken anything for the pain and is declining pain meds at this time.

## 2025-01-22 ENCOUNTER — APPOINTMENT (OUTPATIENT)
Dept: ORTHOPEDIC SURGERY | Facility: CLINIC | Age: 55
End: 2025-01-22
Payer: COMMERCIAL

## 2025-01-22 VITALS — BODY MASS INDEX: 25.3 KG/M2 | HEIGHT: 64.5 IN | WEIGHT: 150 LBS

## 2025-01-22 DIAGNOSIS — S93.491A SPRAIN OF OTHER LIGAMENT OF RIGHT ANKLE, INITIAL ENCOUNTER: ICD-10-CM

## 2025-01-22 DIAGNOSIS — S92.154A NONDISPLACED AVULSION FRACTURE (CHIP FRACTURE) OF RIGHT TALUS, INITIAL ENCOUNTER FOR CLOSED FRACTURE: ICD-10-CM

## 2025-01-22 DIAGNOSIS — D68.51 ACTIVATED PROTEIN C RESISTANCE: ICD-10-CM

## 2025-01-22 PROCEDURE — 99203 OFFICE O/P NEW LOW 30 MIN: CPT

## 2025-01-22 PROCEDURE — 29515 APPLICATION SHORT LEG SPLINT: CPT | Mod: RT

## 2025-01-22 PROCEDURE — 28430 CLTX TALUS FRACTURE W/O MNPJ: CPT

## 2025-01-23 ENCOUNTER — APPOINTMENT (OUTPATIENT)
Dept: OBGYN | Facility: CLINIC | Age: 55
End: 2025-01-23

## 2025-01-27 ENCOUNTER — APPOINTMENT (OUTPATIENT)
Dept: OBGYN | Facility: CLINIC | Age: 55
End: 2025-01-27

## 2025-02-04 ENCOUNTER — APPOINTMENT (OUTPATIENT)
Dept: OBGYN | Facility: CLINIC | Age: 55
End: 2025-02-04

## 2025-02-12 ENCOUNTER — APPOINTMENT (OUTPATIENT)
Dept: ORTHOPEDIC SURGERY | Facility: CLINIC | Age: 55
End: 2025-02-12
Payer: COMMERCIAL

## 2025-02-12 VITALS — BODY MASS INDEX: 25.3 KG/M2 | WEIGHT: 150 LBS | HEIGHT: 64.5 IN

## 2025-02-12 DIAGNOSIS — S92.154D NONDISPLACED AVULSION FRACTURE (CHIP FRACTURE) OF RIGHT TALUS, SUBSEQUENT ENCOUNTER FOR FRACTURE WITH ROUTINE HEALING: ICD-10-CM

## 2025-02-12 PROCEDURE — 73610 X-RAY EXAM OF ANKLE: CPT | Mod: RT

## 2025-02-12 PROCEDURE — 99024 POSTOP FOLLOW-UP VISIT: CPT

## 2025-03-12 ENCOUNTER — APPOINTMENT (OUTPATIENT)
Dept: ORTHOPEDIC SURGERY | Facility: CLINIC | Age: 55
End: 2025-03-12
Payer: COMMERCIAL

## 2025-03-12 VITALS — WEIGHT: 150 LBS | BODY MASS INDEX: 25.3 KG/M2 | HEIGHT: 64.5 IN

## 2025-03-12 DIAGNOSIS — S92.154D NONDISPLACED AVULSION FRACTURE (CHIP FRACTURE) OF RIGHT TALUS, SUBSEQUENT ENCOUNTER FOR FRACTURE WITH ROUTINE HEALING: ICD-10-CM

## 2025-03-12 PROCEDURE — 99024 POSTOP FOLLOW-UP VISIT: CPT

## 2025-04-23 ENCOUNTER — APPOINTMENT (OUTPATIENT)
Dept: ORTHOPEDIC SURGERY | Facility: CLINIC | Age: 55
End: 2025-04-23

## 2025-06-02 ENCOUNTER — NON-APPOINTMENT (OUTPATIENT)
Age: 55
End: 2025-06-02

## 2025-06-02 ENCOUNTER — APPOINTMENT (OUTPATIENT)
Dept: OBGYN | Facility: CLINIC | Age: 55
End: 2025-06-02
Payer: COMMERCIAL

## 2025-06-02 VITALS
BODY MASS INDEX: 25.3 KG/M2 | DIASTOLIC BLOOD PRESSURE: 86 MMHG | HEIGHT: 64.5 IN | WEIGHT: 150 LBS | SYSTOLIC BLOOD PRESSURE: 121 MMHG

## 2025-06-02 DIAGNOSIS — Z01.411 ENCOUNTER FOR GYNECOLOGICAL EXAMINATION (GENERAL) (ROUTINE) WITH ABNORMAL FINDINGS: ICD-10-CM

## 2025-06-02 DIAGNOSIS — N95.1 MENOPAUSAL AND FEMALE CLIMACTERIC STATES: ICD-10-CM

## 2025-06-02 DIAGNOSIS — D68.51 ACTIVATED PROTEIN C RESISTANCE: ICD-10-CM

## 2025-06-02 DIAGNOSIS — D21.9 BENIGN NEOPLASM OF CONNECTIVE AND OTHER SOFT TISSUE, UNSPECIFIED: ICD-10-CM

## 2025-06-02 DIAGNOSIS — N90.5 ATROPHY OF VULVA: ICD-10-CM

## 2025-06-02 DIAGNOSIS — Z13.31 ENCOUNTER FOR SCREENING FOR DEPRESSION: ICD-10-CM

## 2025-06-02 DIAGNOSIS — N95.2 POSTMENOPAUSAL ATROPHIC VAGINITIS: ICD-10-CM

## 2025-06-02 PROCEDURE — G0444 DEPRESSION SCREEN ANNUAL: CPT | Mod: 59

## 2025-06-02 PROCEDURE — 99214 OFFICE O/P EST MOD 30 MIN: CPT | Mod: 25

## 2025-06-02 PROCEDURE — 82270 OCCULT BLOOD FECES: CPT

## 2025-06-02 PROCEDURE — 99396 PREV VISIT EST AGE 40-64: CPT

## 2025-06-02 PROCEDURE — 99459 PELVIC EXAMINATION: CPT | Mod: NC

## 2025-06-02 RX ORDER — MISOPROSTOL 200 UG/1
200 TABLET ORAL
Qty: 2 | Refills: 0 | Status: ACTIVE | COMMUNITY
Start: 2025-06-02 | End: 1900-01-01

## 2025-06-03 LAB
BASOPHILS # BLD AUTO: 0.04 K/UL
BASOPHILS NFR BLD AUTO: 1.1 %
BV BACTERIA RRNA VAG QL NAA+PROBE: NOT DETECTED
C GLABRATA RNA VAG QL NAA+PROBE: NOT DETECTED
CANDIDA RRNA VAG QL PROBE: NOT DETECTED
EOSINOPHIL # BLD AUTO: 0.15 K/UL
EOSINOPHIL NFR BLD AUTO: 4 %
FSH SERPL-MCNC: 78.1 IU/L
HCT VFR BLD CALC: 40 %
HGB BLD-MCNC: 12.6 G/DL
HPV HIGH+LOW RISK DNA PNL CVX: NOT DETECTED
IMM GRANULOCYTES NFR BLD AUTO: 0.3 %
LH SERPL-ACNC: 59.8 IU/L
LYMPHOCYTES # BLD AUTO: 1.49 K/UL
LYMPHOCYTES NFR BLD AUTO: 39.4 %
MAN DIFF?: NORMAL
MCHC RBC-ENTMCNC: 28.7 PG
MCHC RBC-ENTMCNC: 31.5 G/DL
MCV RBC AUTO: 91.1 FL
MONOCYTES # BLD AUTO: 0.3 K/UL
MONOCYTES NFR BLD AUTO: 7.9 %
NEUTROPHILS # BLD AUTO: 1.79 K/UL
NEUTROPHILS NFR BLD AUTO: 47.3 %
PLATELET # BLD AUTO: 158 K/UL
PROLACTIN SERPL-MCNC: 22.7 NG/ML
RBC # BLD: 4.39 M/UL
RBC # FLD: 13.2 %
T VAGINALIS RRNA SPEC QL NAA+PROBE: NOT DETECTED
T4 FREE SERPL-MCNC: 1.5 NG/DL
TSH SERPL-ACNC: 0.09 UIU/ML
WBC # FLD AUTO: 3.78 K/UL

## 2025-06-04 ENCOUNTER — NON-APPOINTMENT (OUTPATIENT)
Age: 55
End: 2025-06-04

## 2025-06-06 LAB — CYTOLOGY CVX/VAG DOC THIN PREP: ABNORMAL

## 2025-06-10 ENCOUNTER — NON-APPOINTMENT (OUTPATIENT)
Age: 55
End: 2025-06-10

## 2025-06-13 ENCOUNTER — APPOINTMENT (OUTPATIENT)
Dept: OBGYN | Facility: CLINIC | Age: 55
End: 2025-06-13
Payer: COMMERCIAL

## 2025-06-13 ENCOUNTER — ASOB RESULT (OUTPATIENT)
Age: 55
End: 2025-06-13

## 2025-06-13 LAB — HCG UR QL: NEGATIVE

## 2025-06-13 PROCEDURE — 76857 US EXAM PELVIC LIMITED: CPT

## 2025-06-13 PROCEDURE — 81025 URINE PREGNANCY TEST: CPT

## 2025-06-13 PROCEDURE — 99214 OFFICE O/P EST MOD 30 MIN: CPT

## 2025-06-13 PROCEDURE — 99214 OFFICE O/P EST MOD 30 MIN: CPT | Mod: 25

## 2025-06-16 LAB
C TRACH RRNA SPEC QL NAA+PROBE: NOT DETECTED
HBV SURFACE AG SER QL: NONREACTIVE
HCV AB SER QL: NONREACTIVE
HCV S/CO RATIO: 0.13 S/CO
HIV1+2 AB SPEC QL IA.RAPID: NONREACTIVE
N GONORRHOEA RRNA SPEC QL NAA+PROBE: NOT DETECTED
SOURCE AMPLIFICATION: NORMAL
T PALLIDUM AB SER QL IA: NEGATIVE

## 2025-08-22 ENCOUNTER — OUTPATIENT (OUTPATIENT)
Dept: OUTPATIENT SERVICES | Facility: HOSPITAL | Age: 55
LOS: 1 days | End: 2025-08-22
Payer: COMMERCIAL

## 2025-08-22 VITALS
DIASTOLIC BLOOD PRESSURE: 78 MMHG | HEART RATE: 69 BPM | HEIGHT: 64.5 IN | OXYGEN SATURATION: 100 % | SYSTOLIC BLOOD PRESSURE: 119 MMHG | TEMPERATURE: 98 F | RESPIRATION RATE: 16 BRPM | WEIGHT: 154.98 LBS

## 2025-08-22 DIAGNOSIS — N88.2 STRICTURE AND STENOSIS OF CERVIX UTERI: ICD-10-CM

## 2025-08-22 DIAGNOSIS — Z98.890 OTHER SPECIFIED POSTPROCEDURAL STATES: Chronic | ICD-10-CM

## 2025-08-22 LAB
ANION GAP SERPL CALC-SCNC: 12 MMOL/L — SIGNIFICANT CHANGE UP (ref 5–17)
BUN SERPL-MCNC: 10 MG/DL — SIGNIFICANT CHANGE UP (ref 7–23)
CALCIUM SERPL-MCNC: 9.4 MG/DL — SIGNIFICANT CHANGE UP (ref 8.4–10.5)
CHLORIDE SERPL-SCNC: 103 MMOL/L — SIGNIFICANT CHANGE UP (ref 96–108)
CO2 SERPL-SCNC: 22 MMOL/L — SIGNIFICANT CHANGE UP (ref 22–31)
CREAT SERPL-MCNC: 0.79 MG/DL — SIGNIFICANT CHANGE UP (ref 0.5–1.3)
EGFR: 89 ML/MIN/1.73M2 — SIGNIFICANT CHANGE UP
EGFR: 89 ML/MIN/1.73M2 — SIGNIFICANT CHANGE UP
GLUCOSE SERPL-MCNC: 92 MG/DL — SIGNIFICANT CHANGE UP (ref 70–99)
HCT VFR BLD CALC: 40.1 % — SIGNIFICANT CHANGE UP (ref 34.5–45)
HGB BLD-MCNC: 13.1 G/DL — SIGNIFICANT CHANGE UP (ref 11.5–15.5)
MCHC RBC-ENTMCNC: 28.5 PG — SIGNIFICANT CHANGE UP (ref 27–34)
MCHC RBC-ENTMCNC: 32.7 G/DL — SIGNIFICANT CHANGE UP (ref 32–36)
MCV RBC AUTO: 87.2 FL — SIGNIFICANT CHANGE UP (ref 80–100)
NRBC # BLD AUTO: 0 K/UL — SIGNIFICANT CHANGE UP (ref 0–0)
NRBC # FLD: 0 K/UL — SIGNIFICANT CHANGE UP (ref 0–0)
NRBC BLD AUTO-RTO: 0 /100 WBCS — SIGNIFICANT CHANGE UP (ref 0–0)
PLATELET # BLD AUTO: 226 K/UL — SIGNIFICANT CHANGE UP (ref 150–400)
PMV BLD: 11.9 FL — SIGNIFICANT CHANGE UP (ref 7–13)
POTASSIUM SERPL-MCNC: 4.5 MMOL/L — SIGNIFICANT CHANGE UP (ref 3.5–5.3)
POTASSIUM SERPL-SCNC: 4.5 MMOL/L — SIGNIFICANT CHANGE UP (ref 3.5–5.3)
RBC # BLD: 4.6 M/UL — SIGNIFICANT CHANGE UP (ref 3.8–5.2)
RBC # FLD: 13.4 % — SIGNIFICANT CHANGE UP (ref 10.3–14.5)
SODIUM SERPL-SCNC: 137 MMOL/L — SIGNIFICANT CHANGE UP (ref 135–145)
WBC # BLD: 5 K/UL — SIGNIFICANT CHANGE UP (ref 3.8–10.5)
WBC # FLD AUTO: 5 K/UL — SIGNIFICANT CHANGE UP (ref 3.8–10.5)

## 2025-08-22 PROCEDURE — 85027 COMPLETE CBC AUTOMATED: CPT

## 2025-08-22 PROCEDURE — G0463: CPT

## 2025-08-22 PROCEDURE — 80048 BASIC METABOLIC PNL TOTAL CA: CPT

## 2025-08-22 RX ORDER — LEVOTHYROXINE SODIUM 300 MCG
1 TABLET ORAL
Refills: 0 | DISCHARGE

## 2025-08-22 RX ORDER — RIVAROXABAN 10 MG/1
2 TABLET, FILM COATED ORAL
Refills: 0 | DISCHARGE

## 2025-08-25 ENCOUNTER — ASOB RESULT (OUTPATIENT)
Age: 55
End: 2025-08-25

## 2025-08-25 ENCOUNTER — APPOINTMENT (OUTPATIENT)
Dept: OBGYN | Facility: CLINIC | Age: 55
End: 2025-08-25
Payer: COMMERCIAL

## 2025-08-25 VITALS — SYSTOLIC BLOOD PRESSURE: 115 MMHG | DIASTOLIC BLOOD PRESSURE: 60 MMHG

## 2025-08-25 DIAGNOSIS — D21.9 BENIGN NEOPLASM OF CONNECTIVE AND OTHER SOFT TISSUE, UNSPECIFIED: ICD-10-CM

## 2025-08-25 DIAGNOSIS — N95.2 POSTMENOPAUSAL ATROPHIC VAGINITIS: ICD-10-CM

## 2025-08-25 DIAGNOSIS — N90.5 ATROPHY OF VULVA: ICD-10-CM

## 2025-08-25 DIAGNOSIS — N88.2 STRICTURE AND STENOSIS OF CERVIX UTERI: ICD-10-CM

## 2025-08-25 DIAGNOSIS — D68.51 ACTIVATED PROTEIN C RESISTANCE: ICD-10-CM

## 2025-08-25 DIAGNOSIS — N93.8 OTHER SPECIFIED ABNORMAL UTERINE AND VAGINAL BLEEDING: ICD-10-CM

## 2025-08-25 DIAGNOSIS — N95.1 MENOPAUSAL AND FEMALE CLIMACTERIC STATES: ICD-10-CM

## 2025-08-25 PROBLEM — I82.409 ACUTE EMBOLISM AND THROMBOSIS OF UNSPECIFIED DEEP VEINS OF UNSPECIFIED LOWER EXTREMITY: Chronic | Status: ACTIVE | Noted: 2025-08-22

## 2025-08-25 PROBLEM — I26.99 OTHER PULMONARY EMBOLISM WITHOUT ACUTE COR PULMONALE: Chronic | Status: ACTIVE | Noted: 2025-08-22

## 2025-08-25 PROBLEM — E78.5 HYPERLIPIDEMIA, UNSPECIFIED: Chronic | Status: ACTIVE | Noted: 2025-08-22

## 2025-08-25 PROCEDURE — 76830 TRANSVAGINAL US NON-OB: CPT

## 2025-08-25 PROCEDURE — 99214 OFFICE O/P EST MOD 30 MIN: CPT

## 2025-08-27 ENCOUNTER — APPOINTMENT (OUTPATIENT)
Dept: OBGYN | Facility: HOSPITAL | Age: 55
End: 2025-08-27

## 2025-08-29 ENCOUNTER — NON-APPOINTMENT (OUTPATIENT)
Age: 55
End: 2025-08-29

## 2025-09-11 ENCOUNTER — APPOINTMENT (OUTPATIENT)
Dept: OBGYN | Facility: HOSPITAL | Age: 55
End: 2025-09-11

## 2025-09-18 ENCOUNTER — TRANSCRIPTION ENCOUNTER (OUTPATIENT)
Age: 55
End: 2025-09-18

## 2025-09-18 ENCOUNTER — APPOINTMENT (OUTPATIENT)
Dept: OBGYN | Facility: HOSPITAL | Age: 55
End: 2025-09-18
Payer: COMMERCIAL

## 2025-09-18 ENCOUNTER — RESULT REVIEW (OUTPATIENT)
Age: 55
End: 2025-09-18

## 2025-09-18 PROCEDURE — ZZZZZ: CPT
